# Patient Record
Sex: MALE | Race: WHITE | NOT HISPANIC OR LATINO | Employment: OTHER | URBAN - METROPOLITAN AREA
[De-identification: names, ages, dates, MRNs, and addresses within clinical notes are randomized per-mention and may not be internally consistent; named-entity substitution may affect disease eponyms.]

---

## 2022-12-27 ENCOUNTER — HOSPITAL ENCOUNTER (EMERGENCY)
Facility: HOSPITAL | Age: 52
Discharge: HOME | End: 2022-12-27
Attending: INTERNAL MEDICINE
Payer: MEDICARE

## 2022-12-27 VITALS
DIASTOLIC BLOOD PRESSURE: 90 MMHG | RESPIRATION RATE: 16 BRPM | WEIGHT: 210 LBS | HEIGHT: 68 IN | BODY MASS INDEX: 31.83 KG/M2 | OXYGEN SATURATION: 98 % | HEART RATE: 55 BPM | TEMPERATURE: 98 F | SYSTOLIC BLOOD PRESSURE: 148 MMHG
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
## 2022-12-27 DIAGNOSIS — M25.511 CHRONIC RIGHT SHOULDER PAIN: ICD-10-CM

## 2022-12-27 DIAGNOSIS — S70.02XA CONTUSION OF LEFT HIP, INITIAL ENCOUNTER: Primary | ICD-10-CM

## 2022-12-27 DIAGNOSIS — S46.912A STRAIN OF LEFT SHOULDER, INITIAL ENCOUNTER: ICD-10-CM

## 2022-12-27 DIAGNOSIS — G89.29 CHRONIC RIGHT SHOULDER PAIN: ICD-10-CM

## 2022-12-27 PROCEDURE — 99284 EMERGENCY DEPT VISIT MOD MDM: CPT

## 2022-12-27 RX ORDER — ATORVASTATIN CALCIUM 20 MG/1
20 TABLET, FILM COATED ORAL
COMMUNITY
Start: 2022-04-11

## 2022-12-27 RX ORDER — NAPROXEN 500 MG/1
500 TABLET ORAL 2 TIMES DAILY WITH MEALS
Qty: 30 TABLET | Refills: 0 | Status: SHIPPED | OUTPATIENT
Start: 2022-12-27 | End: 2023-01-11

## 2022-12-27 RX ORDER — LISINOPRIL 5 MG/1
5 TABLET ORAL
COMMUNITY
Start: 2022-12-20

## 2022-12-27 RX ORDER — EZETIMIBE 10 MG/1
TABLET ORAL
COMMUNITY
Start: 2022-07-19

## 2022-12-27 RX ORDER — CARVEDILOL 6.25 MG/1
6.25 TABLET ORAL 2 TIMES DAILY
COMMUNITY
Start: 2022-08-23

## 2022-12-27 RX ORDER — CITALOPRAM 40 MG/1
40 TABLET, FILM COATED ORAL
COMMUNITY
Start: 2022-12-20

## 2022-12-27 RX ORDER — MELOXICAM 15 MG/1
1 TABLET ORAL
COMMUNITY
Start: 2022-06-20 | End: 2022-12-27

## 2022-12-27 RX ORDER — ROSUVASTATIN CALCIUM 20 MG/1
20 TABLET, COATED ORAL
COMMUNITY
Start: 2022-04-12

## 2022-12-27 RX ORDER — GLIPIZIDE 10 MG/1
10 TABLET ORAL 2 TIMES DAILY
COMMUNITY
Start: 2022-08-23

## 2022-12-27 NOTE — DISCHARGE INSTRUCTIONS
Take medicines as prescribed    See your family doctor in one to 2 days for further evaluation, workup, and treatment as necessary    Avoid driving or operating machinery while taking medicines as some medicines might cause drowsiness and may cause problems. Also pain medicines have potential of being addictive  so use Pain meds specially Narcotics Sparingly.    The exam and treatment you received in Emergency Room was for an urgent problem and NOT INTENDED AS COMPLETE CARE. It is important that you FOLLOW UP with a doctor for ongoing care. If your symptoms become WORSE or you DO NOT IMPROVE and you are unable to reach your health care provider, you should RETURN to the emergency department. The Emergency Room doctor has provided a PRELIMINARY INTERPRETATION of all your STUDIES. A final interpretation may be done after you are discharged. IF A CHANGE in your diagnosis or treatment is needed WE WILL CONTACT YOU. It is critical that we have a CURRENT PHONE NUMBER FOR YOU.        See an orthopedics surgeon to evaluate, do further workup and treat it as necessary.    Telephone numbers of Orthopedics Available in [place] are provided above.    If applied , Keep the splint/ Ace on all the time till seen by Orthopedics and treated and cleared.    Take pain medicines as prescribed    Dr. Mahamed Maier  [phone]    Dr. Jack Brown,  [phone]

## 2022-12-27 NOTE — ED PROVIDER NOTES
12/27/2022  1:51 PM    SOURCE OF HISTORY:  History obtained from the patient.    CHIEF COMPLAINT:  Fall (Working in a house attic and fell through the ceiling,  c/o BL shoulder pain, abrasion to left thigh,  denies LOC)      HISTORY OF PRESENT ILLNESS:  Adam Dc is a 52 y.o. male presenting with complaint of left shoulder, left hip pain where he fell in his attic landing between the beams and the beam hit him in the arm pit,    REVIEW OF SYSTEMS:     AS Per Hpi And Below:  General: No Fever.  No Chills.  Head: No Headache.  No Loss Of Consciousness Or Amnesia.  Eyes: No Visual Changes Reported.  Neck:  No Particular Neck Pain Reported By Patient.  Extremities:  Left shoulder and left hip pain   Back: No particular Back Pain reported by Patient.  Respiratory: No Shortness Of Breath.  No Chest Pain.  Cardiovascular: No Palpitations.  Abdomen: No Abdominal Pain.  No Nausea Or Vomiting.  Skin: No Rashes Or Bruising.  Urinary: No Hematuria.  Neurologic: No Numbness.  No Focal Weakness.   All Other Systems Negative Except As Above As Reviewed With Patient.    ALLERGIES:  Review of patient's allergies indicates:  No Known Allergies    HOME MEDICINE LIST:  No current facility-administered medications for this encounter.    Current Outpatient Medications:     atorvastatin (LIPITOR) 20 MG tablet, Take 20 mg by mouth., Disp: , Rfl:     carvediloL (COREG) 6.25 MG tablet, Take 6.25 mg by mouth 2 (two) times daily., Disp: , Rfl:     citalopram (CELEXA) 40 MG tablet, Take 40 mg by mouth., Disp: , Rfl:     ezetimibe (ZETIA) 10 mg tablet, TAKE ONE TABLET BY MOUTH EVERY DAY **DISCONTINUE NIACIN**, Disp: , Rfl:     glipiZIDE (GLUCOTROL) 10 MG tablet, Take 10 mg by mouth 2 (two) times daily., Disp: , Rfl:     lisinopriL (PRINIVIL,ZESTRIL) 5 MG tablet, Take 5 mg by mouth., Disp: , Rfl:     rosuvastatin (CRESTOR) 20 MG tablet, Take 20 mg by mouth., Disp: , Rfl:     naproxen (NAPROSYN) 500 MG tablet, Take 1 tablet (500 mg total)  "by mouth 2 (two) times daily with meals. for 15 days, Disp: 30 tablet, Rfl: 0    PAST MEDICAL HISTORY:  As per HPI and below:  Past Medical History:   Diagnosis Date    Depression     Diabetes mellitus     Hypercholesterolemia     Hypertension        PAST SURGICAL HISTORY:  Past Surgical History:   Procedure Laterality Date    INSERTION, BARE METAL STENT, CORONARY ARTERY  2015    TONSILLECTOMY         FAMILY HISTORY:  Family History   Problem Relation Age of Onset    Heart disease Mother     Diabetes Mother     Hypertension Mother     Heart disease Father     Diabetes Father     Hypertension Father         SOCIAL HISTORY:  Social History     Tobacco Use    Smoking status: Never   Substance Use Topics    Alcohol use: Never    Drug use: Yes     Types: Marijuana       PROBLEM LIST:  There are no problems to display for this patient.      PHYSICAL EXAM:    Vitals:    12/27/22 1257   BP: 137/88   Pulse: 76   Resp: 16   Temp: 97.9 °F (36.6 °C)       /88   Pulse 76   Temp 97.9 °F (36.6 °C) (Oral)   Resp 16   Ht 5' 8" (1.727 m)   Wt 95.3 kg (210 lb)   SpO2 95%   BMI 31.93 kg/m²     Nursing Note And Vital Signs Reviewed.    APPEARANCE: No Acute Distress.  MENTAL STATUS: Alert And Oriented X 3.  GCS 15.  HEAD/FACE: Atraumatic.  EYES:  Conjunctiva Normal.  No Subconjunctival Hemorrhage.  Extraocular Muscles Are Intact.  NECK: No Midline Cervical Tenderness, No Step Off Noted, No Deformities.  Full Range Of Motion.    BACK: No Midline Thoracic Tenderness, No Step Off Noted, No Deformities.  No Midline Lumbar Or Sacral Spine Tenderness, No Step-Offs Noted, No Deformities Noted.   CHEST: No Chest Wall Tenderness. No Gross Bruising. Breath Sounds Are Equal Bilaterally.  No Wheezes.  No Rhonchi.  No Rales.  CARDIOVASCULAR: Regular Rate And Rhythm.  No Murmurs.   ABDOMEN: Soft. Non Distended. No Tenderness.  No Guarding. No Rebound.   MUSCULOSKELETAL:  Tenderness in the left shoulder, no bruising, no ecchymosis, range " of motion is limited due to pain  No other upper extremity injuries identified  Left hip lateral side ecchymosis, a linear abrasion, tenderness, full range of motion but painful.  Rest of the lower extremities do not have any other significant injuries identified  No tenderness in the chest wall  SKIN: No Gross Ecchymoses. No Gross Signs Of Major Trauma.  Small bruise noted on the right flank but it appears to be old  NEUROLOGIC: No Focal Deficit. Speech Normal, Motor Grossly Normal.        MEDICAL DECISION MAKING:      Chart reviewed, Nurses Note Reviewed, Vital Reviewed.      Medical Decision Making  52 y.o. male  has a past medical history of Depression, Diabetes mellitus, Hypercholesterolemia, and Hypertension. presents to ER with left shoulder and left hip pain from a fall through the ceiling in the attic and hurting where he got hit by the beams.        EDCOURSE:  ED Course as of 12/27/22 1445   Tue Dec 27, 2022   1436 I reviewed x-ray myself, I do not find any fractures as radiologist to look at them also. [GQ]   1444 Patient says when he moves his left shoulder it feels like is coming out of the socket, I have advised him that we are going to put his left shoulder in the sling and and it should go and see the Orthopedics and they can do further workup to look for rotator cuff tear, but as such today I do not see major bruising or swelling in the shoulder to say that he might have torn the ligament today, patient already had surgery on his right shoulder in the past, is saying that last week he is had strained his right shoulder and he wanted to get that checked also his right shoulder does have some more space than the left shoulder but there are no acute fractures in either 1 of them.  So I will let him go home to see his family doctor and Orthopedics for follow-up. [GQ]      ED Course User Index  [GQ] Calista Guy MD             ED Prescriptions       Medication Sig Dispense Start Date End Date Auth.  Provider    naproxen (NAPROSYN) 500 MG tablet Take 1 tablet (500 mg total) by mouth 2 (two) times daily with meals. for 15 days 30 tablet 12/27/2022 1/11/2023 Calista Guy MD          Follow-up Information       Follow up With Specialties Details Why Contact [facility]  In 2 days  [address] [place] [address]  [phone]                ED WORKUP AND COURSE:  ED ORDERS:  Orders Placed This Encounter   Procedures    X-Ray Shoulder Complete 2 View Right    X-Ray Shoulder 2 or More Views Left    X-Ray Hip 2 or 3 views Left (with Pelvis when performed)      ED MEDICINE:  Medications - No data to display         EKG:        ED LABS ORDERED AND REVIEWED:  No visits with results within 1 Day(s) from this visit.   Latest known visit with results is:   No results found for any previous visit.       RADIOLOGY STUDIES ORDERED AND REVIEWED:  Imaging Results              X-Ray Hip 2 or 3 views Left (with Pelvis when performed) (Preliminary result)  Result time 12/27/22 14:36:12      ED Interpretation by Calista Guy MD (12/27/22 14:36:12, [facility] - Emergency Dept, Emergency Medicine)    X-ray hip and pelvis:  Independently interpreted by Calista Guy MD  No acute fractures identified                                     X-Ray Shoulder 2 or More Views Left (Final result)  Result time 12/27/22 14:32:13      Final result by Sarkis Hamilton MD (12/27/22 14:32:13)                   Impression:      1. No acute osseous defect identified  2. Osteoarthritis  3. Mild osteopenia  4. MR examination would allow further evaluation if clinically indicated      Electronically signed by: Sarkis Hamilton  Date:    12/27/2022  Time:    14:32               Narrative:    EXAMINATION:  XR SHOULDER COMPLETE 2 OR MORE VIEWS LEFT    CLINICAL HISTORY:  ; fall;.    COMPARISON:  None available.    FINDINGS:  Internal rotation, external rotation, and transscapular Y-views revealno definite fracture or  dislocation.  Arthritic changes are evident at the acromioclavicular joint.  Bony structures are mildly osteopenic.                                       X-Ray Shoulder Complete 2 View Right (Final result)  Result time 12/27/22 14:31:40      Final result by Sarkis Hamilton MD (12/27/22 14:31:40)                   Impression:      1. Mild asymmetric widening of the right AC joint compared to the left  2. Suspect subchondral cyst formation at the right greater tubercle  3. Osteoarthritis  4. MR examination would allow further evaluation if clinically indicated      Electronically signed by: Sarkis Hamilton  Date:    12/27/2022  Time:    14:31               Narrative:    EXAMINATION:  XR SHOULDER COMPLETE 2 OR MORE VIEWS RIGHT    CLINICAL HISTORY:  Unspecified fall, initial encounter; .    COMPARISON:  None available.    FINDINGS:  Internal rotation, external rotation, and transscapular Y-views revealno definite fracture or dislocation.  There is mild asymmetric widening of the right acromioclavicular joint compared to the left.  There is no elevation of the distal clavicle relative to the acromion.  There is suspect subchondral cyst formation at the right greater tubercle.                                      ED COURSE AND REEVALUATIONS:  Vitals:    12/27/22 1257   BP: 137/88   Pulse: 76   Resp: 16   Temp: 97.9 °F (36.6 °C)       PROCEDURES PERFORMED IN ED:  Procedures    ED Course as of 12/27/22 1445   Tue Dec 27, 2022   1436 I reviewed x-ray myself, I do not find any fractures as radiologist to look at them also. [GQ]   1444 Patient says when he moves his left shoulder it feels like is coming out of the socket, I have advised him that we are going to put his left shoulder in the sling and and it should go and see the Orthopedics and they can do further workup to look for rotator cuff tear, but as such today I do not see major bruising or swelling in the shoulder to say that he might have torn the ligament today,  patient already had surgery on his right shoulder in the past, is saying that last week he is had strained his right shoulder and he wanted to get that checked also his right shoulder does have some more space than the left shoulder but there are no acute fractures in either 1 of them.  So I will let him go home to see his family doctor and Orthopedics for follow-up. [GQ]      ED Course User Index  [GQ] Calista Guy MD        [unfilled]     DIAGNOSTIC IMPRESSION:      1. Contusion of left hip, initial encounter    2. Fall    3. Strain of left shoulder, initial encounter    4. Chronic right shoulder pain         ED Disposition Condition    Discharge Stable               Medication List        START taking these medications      naproxen 500 MG tablet  Commonly known as: NAPROSYN  Take 1 tablet (500 mg total) by mouth 2 (two) times daily with meals. for 15 days            CONTINUE taking these medications      atorvastatin 20 MG tablet  Commonly known as: LIPITOR     carvediloL 6.25 MG tablet  Commonly known as: COREG     citalopram 40 MG tablet  Commonly known as: CeleXA     ezetimibe 10 mg tablet  Commonly known as: ZETIA     glipiZIDE 10 MG tablet  Commonly known as: GLUCOTROL     lisinopriL 5 MG tablet  Commonly known as: PRINIVIL,ZESTRIL     rosuvastatin 20 MG tablet  Commonly known as: CRESTOR            STOP taking these medications      meloxicam 15 MG tablet  Commonly known as: MOBIC               Where to Get Your Medications        You can get these medications from any pharmacy    Bring a paper prescription for each of these medications  naproxen 500 MG tablet          ED Prescriptions       Medication Sig Dispense Start Date End Date Auth. Provider    naproxen (NAPROSYN) 500 MG tablet Take 1 tablet (500 mg total) by mouth 2 (two) times daily with meals. for 15 days 30 tablet 12/27/2022 1/11/2023 Calista Guy MD          Follow-up Information       Follow up With Specialties Details Why  Contact Info    [facility]  In 2 days  [address] [facility] [address]  [phone]               Calista Guy MD  12/27/22 4210       Calista Guy MD  12/27/22 2812

## 2023-01-05 ENCOUNTER — OFFICE VISIT (OUTPATIENT)
Dept: ORTHOPEDICS | Facility: CLINIC | Age: 53
End: 2023-01-05
Payer: MEDICARE

## 2023-01-05 DIAGNOSIS — S46.012A TRAUMATIC COMPLETE TEAR OF LEFT ROTATOR CUFF, INITIAL ENCOUNTER: ICD-10-CM

## 2023-01-05 DIAGNOSIS — S43.015A ANTERIOR DISLOCATION OF LEFT SHOULDER, INITIAL ENCOUNTER: Primary | ICD-10-CM

## 2023-01-05 DIAGNOSIS — S43.492A BANKART LESION OF LEFT SHOULDER, INITIAL ENCOUNTER: ICD-10-CM

## 2023-01-05 PROCEDURE — 1160F RVW MEDS BY RX/DR IN RCRD: CPT | Mod: CPTII,,, | Performed by: ORTHOPAEDIC SURGERY

## 2023-01-05 PROCEDURE — 99204 OFFICE O/P NEW MOD 45 MIN: CPT | Mod: ,,, | Performed by: ORTHOPAEDIC SURGERY

## 2023-01-05 PROCEDURE — 1159F PR MEDICATION LIST DOCUMENTED IN MEDICAL RECORD: ICD-10-PCS | Mod: CPTII,,, | Performed by: ORTHOPAEDIC SURGERY

## 2023-01-05 PROCEDURE — 1160F PR REVIEW ALL MEDS BY PRESCRIBER/CLIN PHARMACIST DOCUMENTED: ICD-10-PCS | Mod: CPTII,,, | Performed by: ORTHOPAEDIC SURGERY

## 2023-01-05 PROCEDURE — 99204 PR OFFICE/OUTPT VISIT, NEW, LEVL IV, 45-59 MIN: ICD-10-PCS | Mod: ,,, | Performed by: ORTHOPAEDIC SURGERY

## 2023-01-05 PROCEDURE — 1159F MED LIST DOCD IN RCRD: CPT | Mod: CPTII,,, | Performed by: ORTHOPAEDIC SURGERY

## 2023-01-05 NOTE — PROGRESS NOTES
History of present illness:    This is a 52 y.o. year old male that presents today with left shoulder pain.  He had a traumatic event on 12/27/2022 when he was in his attic and fell through the ceiling while trying to change out his hot water line.  He dislocated his shoulder and actually popped it back in place.  He went to the emergency room just to confirm that the shoulder was in the normal position.  He was placed in a sling.  He continues to have instability episodes of his left shoulder with subluxations and dislocations.  He follows up today for repeat evaluation.  He also complains of some right shoulder pain as well.    Past Medical History:   Diagnosis Date    Depression     Diabetes mellitus     Hypercholesterolemia     Hypertension        Past Surgical History:   Procedure Laterality Date    INSERTION, BARE METAL STENT, CORONARY ARTERY  2015    TONSILLECTOMY         Current Outpatient Medications   Medication Sig    atorvastatin (LIPITOR) 20 MG tablet Take 20 mg by mouth.    carvediloL (COREG) 6.25 MG tablet Take 6.25 mg by mouth 2 (two) times daily.    citalopram (CELEXA) 40 MG tablet Take 40 mg by mouth.    ezetimibe (ZETIA) 10 mg tablet TAKE ONE TABLET BY MOUTH EVERY DAY **DISCONTINUE NIACIN**    glipiZIDE (GLUCOTROL) 10 MG tablet Take 10 mg by mouth 2 (two) times daily.    lisinopriL (PRINIVIL,ZESTRIL) 5 MG tablet Take 5 mg by mouth.    naproxen (NAPROSYN) 500 MG tablet Take 1 tablet (500 mg total) by mouth 2 (two) times daily with meals. for 15 days    rosuvastatin (CRESTOR) 20 MG tablet Take 20 mg by mouth.     No current facility-administered medications for this visit.       Review of patient's allergies indicates:  No Known Allergies    Family History   Problem Relation Age of Onset    Heart disease Mother     Diabetes Mother     Hypertension Mother     Heart disease Father     Diabetes Father     Hypertension Father        Social History     Socioeconomic History    Marital status: Single    Tobacco Use    Smoking status: Never    Smokeless tobacco: Never   Substance and Sexual Activity    Alcohol use: Never    Drug use: Yes     Types: Marijuana       Chief Complaint:   Chief Complaint   Patient presents with    Left Shoulder - Pain    Right Shoulder - Pain    Shoulder Pain     bilateral shoulder pain pt went to [facility] on 12/27/22, fell through ceiling at his house changing hot water line, left shoulder wearing sling, left shoulder comes out of socket when he bends or turns it the wrong way no prior injury or surgery. Right shoulder feels sore, had prior injury and surgery.       Consulting Physician: No ref. provider found      Review of Systems:    All review of systems negative except for those stated in the HPI    Examination:    Vital Signs:  There were no vitals filed for this visit.    There is no height or weight on file to calculate BMI.    Physical Exam:   General: Well-developed, well-nourished.  Neuro: Alert and oriented x 3.  Psych: Normal mood and affect.  Left Shoulder Exam:  No obvious deformity. No medial or lateral scapula winging.  Range of motion and provocative exams are difficult due to pain. 4/5 strength, normal skin appearance and palpable pulses distally. Sensibility normal.        Assessment: Anterior dislocation of left shoulder, initial encounter    Bankart lesion of left shoulder, initial encounter    Traumatic complete tear of left rotator cuff, initial encounter        Plan:    Based on this patient's history, it is imperative that I order an MRI of his left shoulder.  This will definitely be a roadmap for surgery.  I plan to see him back in 1 week to review the MRI.            DISCLAIMER: This note may have been dictated using voice recognition software and may contain grammatical errors.     NOTE: Consult report sent to referring provider via [facility].

## 2023-01-10 ENCOUNTER — OFFICE VISIT (OUTPATIENT)
Dept: ORTHOPEDICS | Facility: CLINIC | Age: 53
End: 2023-01-10
Payer: MEDICARE

## 2023-01-10 DIAGNOSIS — S43.492A BANKART LESION OF LEFT SHOULDER, INITIAL ENCOUNTER: ICD-10-CM

## 2023-01-10 DIAGNOSIS — M19.012 ARTHROSIS OF LEFT ACROMIOCLAVICULAR JOINT: ICD-10-CM

## 2023-01-10 DIAGNOSIS — S43.015A ANTERIOR DISLOCATION OF LEFT SHOULDER, INITIAL ENCOUNTER: Primary | ICD-10-CM

## 2023-01-10 DIAGNOSIS — Z01.818 PREOPERATIVE TESTING: ICD-10-CM

## 2023-01-10 DIAGNOSIS — S46.012A TRAUMATIC COMPLETE TEAR OF LEFT ROTATOR CUFF, INITIAL ENCOUNTER: ICD-10-CM

## 2023-01-10 PROCEDURE — 99214 OFFICE O/P EST MOD 30 MIN: CPT | Mod: ,,, | Performed by: ORTHOPAEDIC SURGERY

## 2023-01-10 PROCEDURE — 1159F MED LIST DOCD IN RCRD: CPT | Mod: CPTII,,, | Performed by: ORTHOPAEDIC SURGERY

## 2023-01-10 PROCEDURE — 1159F PR MEDICATION LIST DOCUMENTED IN MEDICAL RECORD: ICD-10-PCS | Mod: CPTII,,, | Performed by: ORTHOPAEDIC SURGERY

## 2023-01-10 PROCEDURE — 1160F RVW MEDS BY RX/DR IN RCRD: CPT | Mod: CPTII,,, | Performed by: ORTHOPAEDIC SURGERY

## 2023-01-10 PROCEDURE — 1160F PR REVIEW ALL MEDS BY PRESCRIBER/CLIN PHARMACIST DOCUMENTED: ICD-10-PCS | Mod: CPTII,,, | Performed by: ORTHOPAEDIC SURGERY

## 2023-01-10 PROCEDURE — 99214 PR OFFICE/OUTPT VISIT, EST, LEVL IV, 30-39 MIN: ICD-10-PCS | Mod: ,,, | Performed by: ORTHOPAEDIC SURGERY

## 2023-01-10 RX ORDER — GABAPENTIN 100 MG/1
600 CAPSULE ORAL
Status: CANCELLED | OUTPATIENT
Start: 2023-01-10

## 2023-01-10 RX ORDER — SODIUM CHLORIDE, SODIUM GLUCONATE, SODIUM ACETATE, POTASSIUM CHLORIDE AND MAGNESIUM CHLORIDE 30; 37; 368; 526; 502 MG/100ML; MG/100ML; MG/100ML; MG/100ML; MG/100ML
250 INJECTION, SOLUTION INTRAVENOUS CONTINUOUS
Status: CANCELLED | OUTPATIENT
Start: 2023-01-10

## 2023-01-10 RX ORDER — KETOROLAC TROMETHAMINE 10 MG/1
10 TABLET, FILM COATED ORAL
Status: CANCELLED | OUTPATIENT
Start: 2023-01-10 | End: 2023-01-10

## 2023-01-10 RX ORDER — ACETAMINOPHEN 500 MG
1000 TABLET ORAL
Status: CANCELLED | OUTPATIENT
Start: 2023-01-10

## 2023-01-10 NOTE — H&P (VIEW-ONLY)
History of present illness:    This is a 52 y.o. year old male that presents today with left shoulder pain.  He had a traumatic event on 12/27/2022 when he was in his attic and fell through the ceiling while trying to change out his hot water line.  He dislocated his shoulder and actually popped it back in place.  He went to the emergency room just to confirm that the shoulder was in the normal position.  He was placed in a sling.  He continues to have instability episodes of his left shoulder with subluxations and dislocations.  He follows up today for repeat evaluation.  He also complains of some right shoulder pain as well.  01/10/2023 this patient comes in today to review his left shoulder MRI which demonstrates a bony Bankart injury to the shoulder as well as a significant rotator cuff tear greater than 2.5 cm.    Past Medical History:   Diagnosis Date    Depression     Diabetes mellitus     Hypercholesterolemia     Hypertension        Past Surgical History:   Procedure Laterality Date    INSERTION, BARE METAL STENT, CORONARY ARTERY  2015    TONSILLECTOMY         Current Outpatient Medications   Medication Sig    atorvastatin (LIPITOR) 20 MG tablet Take 20 mg by mouth.    carvediloL (COREG) 6.25 MG tablet Take 6.25 mg by mouth 2 (two) times daily.    citalopram (CELEXA) 40 MG tablet Take 40 mg by mouth.    ezetimibe (ZETIA) 10 mg tablet TAKE ONE TABLET BY MOUTH EVERY DAY **DISCONTINUE NIACIN**    glipiZIDE (GLUCOTROL) 10 MG tablet Take 10 mg by mouth 2 (two) times daily.    lisinopriL (PRINIVIL,ZESTRIL) 5 MG tablet Take 5 mg by mouth.    naproxen (NAPROSYN) 500 MG tablet Take 1 tablet (500 mg total) by mouth 2 (two) times daily with meals. for 15 days    rosuvastatin (CRESTOR) 20 MG tablet Take 20 mg by mouth.     No current facility-administered medications for this visit.       Review of patient's allergies indicates:  No Known Allergies    Family History   Problem Relation Age of Onset    Heart disease  Mother     Diabetes Mother     Hypertension Mother     Heart disease Father     Diabetes Father     Hypertension Father        Social History     Socioeconomic History    Marital status: Single   Tobacco Use    Smoking status: Never    Smokeless tobacco: Never   Substance and Sexual Activity    Alcohol use: Never    Drug use: Yes     Types: Marijuana       Chief Complaint:   Chief Complaint   Patient presents with    Left Shoulder - Results    Results     left shoulder MRI results, wearing sling,  pt states nothing has changed since last appt, unable to obtain vitals.        Consulting Physician: No ref. provider found      Review of Systems:    All review of systems negative except for those stated in the HPI    Examination:    Vital Signs:  There were no vitals filed for this visit.    There is no height or weight on file to calculate BMI.    Physical Exam:   General: Well-developed, well-nourished.  Neuro: Alert and oriented x 3.  Psych: Normal mood and affect.  Left Shoulder Exam:  No obvious deformity. No medial or lateral scapula winging.  Range of motion and provocative exams are difficult due to pain. 4/5 strength, normal skin appearance and palpable pulses distally. Sensibility normal.        Assessment: Anterior dislocation of left shoulder, initial encounter  -     Place in Outpatient; Standing  -     Vital signs; Standing  -     Insert peripheral IV; Standing  -     Clip and Prep Other (please specifiy) (Operative site); Standing  -     Cleanse with Chlorhexidine (CHG); Standing  -     Diet NPO; Standing  -     POCT glucose; Standing  -     CBC auto differential; Future  -     Comprehensive metabolic panel; Future  -     X-Ray Chest PA And Lateral; Future; Expected date: 01/10/2023  -     EKG 12-lead; Future  -     Inpatient consult to Anesthesiology; Standing  -     Case Request Operating Room: ARTHROSCOPY, SHOULDER, WITH BANKART REPAIR, DEBRIDEMENT, ROTATOR CUFF, ARTHROSCOPIC  -     Place TIARA hose;  Standing    Bankart lesion of left shoulder, initial encounter  -     Place in Outpatient; Standing  -     Vital signs; Standing  -     Insert peripheral IV; Standing  -     Clip and Prep Other (please specifiy) (Operative site); Standing  -     Cleanse with Chlorhexidine (CHG); Standing  -     Diet NPO; Standing  -     POCT glucose; Standing  -     CBC auto differential; Future  -     Comprehensive metabolic panel; Future  -     X-Ray Chest PA And Lateral; Future; Expected date: 01/10/2023  -     EKG 12-lead; Future  -     Inpatient consult to Anesthesiology; Standing  -     Case Request Operating Room: ARTHROSCOPY, SHOULDER, WITH BANKART REPAIR, DEBRIDEMENT, ROTATOR CUFF, ARTHROSCOPIC  -     Place TIARA hose; Standing    Traumatic complete tear of left rotator cuff, initial encounter  -     Place in Outpatient; Standing  -     Vital signs; Standing  -     Insert peripheral IV; Standing  -     Clip and Prep Other (please specifiy) (Operative site); Standing  -     Cleanse with Chlorhexidine (CHG); Standing  -     Diet NPO; Standing  -     POCT glucose; Standing  -     CBC auto differential; Future  -     Comprehensive metabolic panel; Future  -     X-Ray Chest PA And Lateral; Future; Expected date: 01/10/2023  -     EKG 12-lead; Future  -     Inpatient consult to Anesthesiology; Standing  -     Case Request Operating Room: ARTHROSCOPY, SHOULDER, WITH BANKART REPAIR, DEBRIDEMENT, ROTATOR CUFF, ARTHROSCOPIC  -     Place TIARA hose; Standing    Arthrosis of left acromioclavicular joint  -     Place in Outpatient; Standing  -     Vital signs; Standing  -     Insert peripheral IV; Standing  -     Clip and Prep Other (please specifiy) (Operative site); Standing  -     Cleanse with Chlorhexidine (CHG); Standing  -     Diet NPO; Standing  -     POCT glucose; Standing  -     Comprehensive metabolic panel; Future  -     X-Ray Chest PA And Lateral; Future; Expected date: 01/10/2023  -     EKG 12-lead; Future  -     Inpatient  consult to Anesthesiology; Standing  -     Case Request Operating Room: ARTHROSCOPY, SHOULDER, WITH BANKART REPAIR, DEBRIDEMENT, ROTATOR CUFF, ARTHROSCOPIC  -     Place TIARA hose; Standing    Preoperative testing  -     CBC auto differential; Future    Other orders  -     electrolyte-A infusion  -     IP VTE LOW RISK PATIENT; Standing  -     ceFAZolin (ANCEF) 2 g in dextrose 5 % 50 mL IVPB  -     acetaminophen tablet 1,000 mg  -     ketorolac tablet 10 mg  -     gabapentin capsule 600 mg          Plan:    This is a very significant injury.  The rotator cuff seems to be a acute on chronic type of problem.  More than likely he had a previous significant partial tear or full-thickness tear of the rotator cuff.  He will need surgical intervention for stabilization of the shoulder.  After reviewing his MRI I think the best mode of action would be to do a arthroscopic Bankart repair with a rotator cuff repair or partial rotator cuff repair.  However if the rotator cuff tissue is not amenable to any type of repair, then I will do a staged superior capsular reconstruction.  I did mention to him about the possibility of doing a reverse shoulder arthroplasty but that will be our last option. A surgical video explaining the surgery and the risk was reviewed by the patient.  The surgical procedure was explained to the patient in detail.  The patient acknowledges that they understood the risks, benefits, and other alternatives.  Patient signed an informed consent.  This patient will need a postoperative abduction pillow sling to unload the rotator cuff, labrum, and biceps tendon repair so that patient can heal properly.            DISCLAIMER: This note may have been dictated using voice recognition software and may contain grammatical errors.     NOTE: Consult report sent to referring provider via [employer].

## 2023-01-10 NOTE — PROGRESS NOTES
History of present illness:    This is a 52 y.o. year old male that presents today with left shoulder pain.  He had a traumatic event on 12/27/2022 when he was in his attic and fell through the ceiling while trying to change out his hot water line.  He dislocated his shoulder and actually popped it back in place.  He went to the emergency room just to confirm that the shoulder was in the normal position.  He was placed in a sling.  He continues to have instability episodes of his left shoulder with subluxations and dislocations.  He follows up today for repeat evaluation.  He also complains of some right shoulder pain as well.  01/10/2023 this patient comes in today to review his left shoulder MRI which demonstrates a bony Bankart injury to the shoulder as well as a significant rotator cuff tear greater than 2.5 cm.    Past Medical History:   Diagnosis Date    Depression     Diabetes mellitus     Hypercholesterolemia     Hypertension        Past Surgical History:   Procedure Laterality Date    INSERTION, BARE METAL STENT, CORONARY ARTERY  2015    TONSILLECTOMY         Current Outpatient Medications   Medication Sig    atorvastatin (LIPITOR) 20 MG tablet Take 20 mg by mouth.    carvediloL (COREG) 6.25 MG tablet Take 6.25 mg by mouth 2 (two) times daily.    citalopram (CELEXA) 40 MG tablet Take 40 mg by mouth.    ezetimibe (ZETIA) 10 mg tablet TAKE ONE TABLET BY MOUTH EVERY DAY **DISCONTINUE NIACIN**    glipiZIDE (GLUCOTROL) 10 MG tablet Take 10 mg by mouth 2 (two) times daily.    lisinopriL (PRINIVIL,ZESTRIL) 5 MG tablet Take 5 mg by mouth.    naproxen (NAPROSYN) 500 MG tablet Take 1 tablet (500 mg total) by mouth 2 (two) times daily with meals. for 15 days    rosuvastatin (CRESTOR) 20 MG tablet Take 20 mg by mouth.     No current facility-administered medications for this visit.       Review of patient's allergies indicates:  No Known Allergies    Family History   Problem Relation Age of Onset    Heart disease  Mother     Diabetes Mother     Hypertension Mother     Heart disease Father     Diabetes Father     Hypertension Father        Social History     Socioeconomic History    Marital status: Single   Tobacco Use    Smoking status: Never    Smokeless tobacco: Never   Substance and Sexual Activity    Alcohol use: Never    Drug use: Yes     Types: Marijuana       Chief Complaint:   Chief Complaint   Patient presents with    Left Shoulder - Results    Results     left shoulder MRI results, wearing sling,  pt states nothing has changed since last appt, unable to obtain vitals.        Consulting Physician: No ref. provider found      Review of Systems:    All review of systems negative except for those stated in the HPI    Examination:    Vital Signs:  There were no vitals filed for this visit.    There is no height or weight on file to calculate BMI.    Physical Exam:   General: Well-developed, well-nourished.  Neuro: Alert and oriented x 3.  Psych: Normal mood and affect.  Left Shoulder Exam:  No obvious deformity. No medial or lateral scapula winging.  Range of motion and provocative exams are difficult due to pain. 4/5 strength, normal skin appearance and palpable pulses distally. Sensibility normal.        Assessment: Anterior dislocation of left shoulder, initial encounter  -     Place in Outpatient; Standing  -     Vital signs; Standing  -     Insert peripheral IV; Standing  -     Clip and Prep Other (please specifiy) (Operative site); Standing  -     Cleanse with Chlorhexidine (CHG); Standing  -     Diet NPO; Standing  -     POCT glucose; Standing  -     CBC auto differential; Future  -     Comprehensive metabolic panel; Future  -     X-Ray Chest PA And Lateral; Future; Expected date: 01/10/2023  -     EKG 12-lead; Future  -     Inpatient consult to Anesthesiology; Standing  -     Case Request Operating Room: ARTHROSCOPY, SHOULDER, WITH BANKART REPAIR, DEBRIDEMENT, ROTATOR CUFF, ARTHROSCOPIC  -     Place TIARA hose;  Standing    Bankart lesion of left shoulder, initial encounter  -     Place in Outpatient; Standing  -     Vital signs; Standing  -     Insert peripheral IV; Standing  -     Clip and Prep Other (please specifiy) (Operative site); Standing  -     Cleanse with Chlorhexidine (CHG); Standing  -     Diet NPO; Standing  -     POCT glucose; Standing  -     CBC auto differential; Future  -     Comprehensive metabolic panel; Future  -     X-Ray Chest PA And Lateral; Future; Expected date: 01/10/2023  -     EKG 12-lead; Future  -     Inpatient consult to Anesthesiology; Standing  -     Case Request Operating Room: ARTHROSCOPY, SHOULDER, WITH BANKART REPAIR, DEBRIDEMENT, ROTATOR CUFF, ARTHROSCOPIC  -     Place TIARA hose; Standing    Traumatic complete tear of left rotator cuff, initial encounter  -     Place in Outpatient; Standing  -     Vital signs; Standing  -     Insert peripheral IV; Standing  -     Clip and Prep Other (please specifiy) (Operative site); Standing  -     Cleanse with Chlorhexidine (CHG); Standing  -     Diet NPO; Standing  -     POCT glucose; Standing  -     CBC auto differential; Future  -     Comprehensive metabolic panel; Future  -     X-Ray Chest PA And Lateral; Future; Expected date: 01/10/2023  -     EKG 12-lead; Future  -     Inpatient consult to Anesthesiology; Standing  -     Case Request Operating Room: ARTHROSCOPY, SHOULDER, WITH BANKART REPAIR, DEBRIDEMENT, ROTATOR CUFF, ARTHROSCOPIC  -     Place TIARA hose; Standing    Arthrosis of left acromioclavicular joint  -     Place in Outpatient; Standing  -     Vital signs; Standing  -     Insert peripheral IV; Standing  -     Clip and Prep Other (please specifiy) (Operative site); Standing  -     Cleanse with Chlorhexidine (CHG); Standing  -     Diet NPO; Standing  -     POCT glucose; Standing  -     Comprehensive metabolic panel; Future  -     X-Ray Chest PA And Lateral; Future; Expected date: 01/10/2023  -     EKG 12-lead; Future  -     Inpatient  consult to Anesthesiology; Standing  -     Case Request Operating Room: ARTHROSCOPY, SHOULDER, WITH BANKART REPAIR, DEBRIDEMENT, ROTATOR CUFF, ARTHROSCOPIC  -     Place TIARA hose; Standing    Preoperative testing  -     CBC auto differential; Future    Other orders  -     electrolyte-A infusion  -     IP VTE LOW RISK PATIENT; Standing  -     ceFAZolin (ANCEF) 2 g in dextrose 5 % 50 mL IVPB  -     acetaminophen tablet 1,000 mg  -     ketorolac tablet 10 mg  -     gabapentin capsule 600 mg          Plan:    This is a very significant injury.  The rotator cuff seems to be a acute on chronic type of problem.  More than likely he had a previous significant partial tear or full-thickness tear of the rotator cuff.  He will need surgical intervention for stabilization of the shoulder.  After reviewing his MRI I think the best mode of action would be to do a arthroscopic Bankart repair with a rotator cuff repair or partial rotator cuff repair.  However if the rotator cuff tissue is not amenable to any type of repair, then I will do a staged superior capsular reconstruction.  I did mention to him about the possibility of doing a reverse shoulder arthroplasty but that will be our last option. A surgical video explaining the surgery and the risk was reviewed by the patient.  The surgical procedure was explained to the patient in detail.  The patient acknowledges that they understood the risks, benefits, and other alternatives.  Patient signed an informed consent.  This patient will need a postoperative abduction pillow sling to unload the rotator cuff, labrum, and biceps tendon repair so that patient can heal properly.            DISCLAIMER: This note may have been dictated using voice recognition software and may contain grammatical errors.     NOTE: Consult report sent to referring provider via [employer].

## 2023-01-17 ENCOUNTER — ANESTHESIA EVENT (OUTPATIENT)
Dept: SURGERY | Facility: HOSPITAL | Age: 53
End: 2023-01-17
Payer: MEDICARE

## 2023-01-18 ENCOUNTER — HOSPITAL ENCOUNTER (OUTPATIENT)
Dept: RADIOLOGY | Facility: HOSPITAL | Age: 53
Discharge: HOME | End: 2023-01-18
Payer: MEDICARE

## 2023-01-18 DIAGNOSIS — S43.015A ANTERIOR DISLOCATION OF LEFT SHOULDER, INITIAL ENCOUNTER: ICD-10-CM

## 2023-01-18 DIAGNOSIS — S46.012A TRAUMATIC COMPLETE TEAR OF LEFT ROTATOR CUFF, INITIAL ENCOUNTER: ICD-10-CM

## 2023-01-18 DIAGNOSIS — M19.012 ARTHROSIS OF LEFT ACROMIOCLAVICULAR JOINT: ICD-10-CM

## 2023-01-18 DIAGNOSIS — S43.492A BANKART LESION OF LEFT SHOULDER, INITIAL ENCOUNTER: ICD-10-CM

## 2023-01-18 PROCEDURE — 71046 X-RAY EXAM CHEST 2 VIEWS: CPT | Mod: TC

## 2023-01-19 ENCOUNTER — TELEPHONE (OUTPATIENT)
Dept: PREADMISSION TESTING | Facility: HOSPITAL | Age: 53
End: 2023-01-19

## 2023-01-22 NOTE — ANESTHESIA PREPROCEDURE EVALUATION
01/22/2023  Adam Dc is a 52 y.o., male , who presents for the following:    Procedures:        ARTHROSCOPY, SHOULDER, WITH BANKART REPAIR (Left) - Arthrex, lateral shoulder traction, bean bag, block       DEBRIDEMENT, ROTATOR CUFF, ARTHROSCOPIC (Left)   Anesthesia type: General/Regional   Diagnosis:        Anterior dislocation of left shoulder, initial encounter [S43.015A]       Bankart lesion of left shoulder, initial encounter [S43.492A]       Traumatic complete tear of left rotator cuff, initial encounter [S46.012A]       Arthrosis of left acromioclavicular joint [M19.012]   Pre-op diagnosis:        Anterior dislocation of left shoulder, initial encounter [S43.015A]       Bankart lesion of left shoulder, initial encounter [S43.492A]       Traumatic complete tear of left rotator cuff, initial encounter [S46.012A]       Arthrosis of left acromioclavicular joint [M19.012]   Location: [facility] OR [address] [place] [address] OR   Surgeons: Jack Brown MD       Pre-op Assessment    I have reviewed the Patient Summary Reports.     I have reviewed the Nursing Notes. I have reviewed the NPO Status.   I have reviewed the Medications.     Review of Systems  Anesthesia Hx:  No problems with previous Anesthesia  Denies Family Hx of Anesthesia complications.   Denies Personal Hx of Anesthesia complications.   Social:  Non-Smoker    Cardiovascular:   Hypertension CAD   hyperlipidemia s/p MI  s/p stent (DESC-L. Circ.) approx. 7 yrs ago  > 4 METS activity   Pulmonary:  Pulmonary Normal    Endocrine:   Diabetes    Psych:   depression          Physical Exam  General: Alert and Oriented    Airway:  Mallampati: II   Mouth Opening: Normal  TM Distance: Normal  Tongue: Normal  Neck ROM: Normal ROM    Dental:Age Related Wear  Chest/Lungs:  Normal Respiratory Rate    Heart:  Rate: Normal  Rhythm: Regular Rhythm       Latest Reference Range &  Units 01/18/23 09:53   Sodium 136 - 145 mmol/L 137   Potassium 3.5 - 5.1 mmol/L 4.9   Chloride 98 - 107 mmol/L 105   CO2 22 - 29 mmol/L 26   BUN 8.4 - 25.7 mg/dL 16.3   Creatinine 0.73 - 1.18 mg/dL 0.99   eGFR mls/min/1.73/m2 >60   Glucose 74 - 100 mg/dL 241 (H)   (H): Data is abnormally high   Latest Reference Range & Units 01/18/23 09:53   Hemoglobin 14.0 - 18.0 gm/dL 14.6   Hematocrit 42.0 - 52.0 % 43.4   MCV 80.0 - 94.0 fL 88.2   MCH pg 29.7   MCHC 33.0 - 36.0 mg/dL 33.6   RDW 11.5 - 17.0 % 12.5   Platelets 130 - 400 x10(3)/mcL 253             Anesthesia Plan  Type of Anesthesia, risks & benefits discussed:    Anesthesia Type: Gen ETT  Intra-op Monitoring Plan: Standard ASA Monitors  Post Op Pain Control Plan: IV/PO Opioids PRN, multimodal analgesia and peripheral nerve block  Induction:  IV  Airway Plan: Direct  Informed Consent: Informed consent signed with the Patient and all parties understand the risks and agree with anesthesia plan.  All questions answered. Patient consented to blood products? Yes  ASA Score: 2  Day of Surgery Review of History & Physical: H&P Update referred to the surgeon/provider.H&P completed by Anesthesiologist.  Anesthesia Plan Notes: ERAS / Supraclavicular Block for post-operative analgesia, per surgeon request    Ready For Surgery From Anesthesia Perspective.     .

## 2023-01-23 ENCOUNTER — HOSPITAL ENCOUNTER (OUTPATIENT)
Facility: HOSPITAL | Age: 53
Discharge: HOME | End: 2023-01-23
Attending: ORTHOPAEDIC SURGERY | Admitting: ORTHOPAEDIC SURGERY
Payer: MEDICARE

## 2023-01-23 ENCOUNTER — ANESTHESIA (OUTPATIENT)
Dept: SURGERY | Facility: HOSPITAL | Age: 53
End: 2023-01-23
Payer: MEDICARE

## 2023-01-23 VITALS
TEMPERATURE: 98 F | HEIGHT: 68 IN | RESPIRATION RATE: 20 BRPM | HEART RATE: 79 BPM | SYSTOLIC BLOOD PRESSURE: 132 MMHG | OXYGEN SATURATION: 95 % | DIASTOLIC BLOOD PRESSURE: 76 MMHG | BODY MASS INDEX: 32.44 KG/M2 | WEIGHT: 214.06 LBS

## 2023-01-23 DIAGNOSIS — S43.015A ANTERIOR DISLOCATION OF LEFT SHOULDER, INITIAL ENCOUNTER: ICD-10-CM

## 2023-01-23 DIAGNOSIS — S46.012A TRAUMATIC COMPLETE TEAR OF LEFT ROTATOR CUFF, INITIAL ENCOUNTER: ICD-10-CM

## 2023-01-23 DIAGNOSIS — M19.012 ARTHROSIS OF LEFT ACROMIOCLAVICULAR JOINT: ICD-10-CM

## 2023-01-23 DIAGNOSIS — Z01.818 PREOP TESTING: ICD-10-CM

## 2023-01-23 DIAGNOSIS — S43.492A BANKART LESION OF LEFT SHOULDER, INITIAL ENCOUNTER: Primary | ICD-10-CM

## 2023-01-23 LAB — POCT GLUCOSE: 209 MG/DL (ref 70–110)

## 2023-01-23 PROCEDURE — 64415 NJX AA&/STRD BRCH PLXS IMG: CPT | Mod: 59,LT | Performed by: ANESTHESIOLOGY

## 2023-01-23 PROCEDURE — 82962 GLUCOSE BLOOD TEST: CPT | Performed by: ORTHOPAEDIC SURGERY

## 2023-01-23 PROCEDURE — 36000711: Performed by: ORTHOPAEDIC SURGERY

## 2023-01-23 PROCEDURE — 63600175 PHARM REV CODE 636 W HCPCS: Performed by: ANESTHESIOLOGY

## 2023-01-23 PROCEDURE — 25000003 PHARM REV CODE 250: Performed by: NURSE ANESTHETIST, CERTIFIED REGISTERED

## 2023-01-23 PROCEDURE — 37000009 HC ANESTHESIA EA ADD 15 MINS: Performed by: ORTHOPAEDIC SURGERY

## 2023-01-23 PROCEDURE — 63600175 PHARM REV CODE 636 W HCPCS: Mod: TB,JG

## 2023-01-23 PROCEDURE — 36000710: Performed by: ORTHOPAEDIC SURGERY

## 2023-01-23 PROCEDURE — 27201423 OPTIME MED/SURG SUP & DEVICES STERILE SUPPLY: Performed by: ORTHOPAEDIC SURGERY

## 2023-01-23 PROCEDURE — 71000015 HC POSTOP RECOV 1ST HR: Performed by: ORTHOPAEDIC SURGERY

## 2023-01-23 PROCEDURE — 29806 SHO ARTHRS SRG CAPSULORRAPHY: CPT | Mod: LT,,, | Performed by: ORTHOPAEDIC SURGERY

## 2023-01-23 PROCEDURE — 25000003 PHARM REV CODE 250

## 2023-01-23 PROCEDURE — C1713 ANCHOR/SCREW BN/BN,TIS/BN: HCPCS | Performed by: ORTHOPAEDIC SURGERY

## 2023-01-23 PROCEDURE — 63600175 PHARM REV CODE 636 W HCPCS: Performed by: NURSE ANESTHETIST, CERTIFIED REGISTERED

## 2023-01-23 PROCEDURE — 37000008 HC ANESTHESIA 1ST 15 MINUTES: Performed by: ORTHOPAEDIC SURGERY

## 2023-01-23 PROCEDURE — 71000016 HC POSTOP RECOV ADDL HR: Performed by: ORTHOPAEDIC SURGERY

## 2023-01-23 PROCEDURE — 76942 ECHO GUIDE FOR BIOPSY: CPT | Performed by: ANESTHESIOLOGY

## 2023-01-23 PROCEDURE — 29806 PR SHLDR ARTHROSCOP,SURG,CAPSULORRHAPHY: ICD-10-PCS | Mod: LT,,, | Performed by: ORTHOPAEDIC SURGERY

## 2023-01-23 PROCEDURE — 63600175 PHARM REV CODE 636 W HCPCS

## 2023-01-23 DEVICE — SUTR ANCH,BIO-COMP P-LCK,2.9X 12.5MM
Type: IMPLANTABLE DEVICE | Site: SHOULDER | Status: FUNCTIONAL
Brand: ARTHREX®

## 2023-01-23 DEVICE — PEEK SWIVELOCK C, 5.5X19.1MM
Type: IMPLANTABLE DEVICE | Site: SHOULDER | Status: FUNCTIONAL
Brand: ARTHREX®

## 2023-01-23 DEVICE — SUTURE ANCHOR, PEEK CORKSCREW FT
Type: IMPLANTABLE DEVICE | Site: SHOULDER | Status: FUNCTIONAL
Brand: ARTHREX®

## 2023-01-23 RX ORDER — METOCLOPRAMIDE HYDROCHLORIDE 5 MG/ML
10 INJECTION INTRAMUSCULAR; INTRAVENOUS EVERY 6 HOURS PRN
Status: DISCONTINUED | OUTPATIENT
Start: 2023-01-23 | End: 2023-01-23 | Stop reason: HOSPADM

## 2023-01-23 RX ORDER — ROPIVACAINE HYDROCHLORIDE 5 MG/ML
40 INJECTION, SOLUTION EPIDURAL; INFILTRATION; PERINEURAL ONCE
Status: DISCONTINUED | OUTPATIENT
Start: 2023-01-23 | End: 2023-01-23

## 2023-01-23 RX ORDER — MIDAZOLAM HYDROCHLORIDE 1 MG/ML
INJECTION INTRAMUSCULAR; INTRAVENOUS
Status: COMPLETED
Start: 2023-01-23 | End: 2023-01-23

## 2023-01-23 RX ORDER — GABAPENTIN 300 MG/1
600 CAPSULE ORAL
Status: COMPLETED | OUTPATIENT
Start: 2023-01-23 | End: 2023-01-23

## 2023-01-23 RX ORDER — ROPIVACAINE HYDROCHLORIDE 5 MG/ML
INJECTION, SOLUTION EPIDURAL; INFILTRATION; PERINEURAL
Status: COMPLETED
Start: 2023-01-23 | End: 2023-01-23

## 2023-01-23 RX ORDER — SODIUM CHLORIDE 9 MG/ML
INJECTION, SOLUTION INTRAVENOUS CONTINUOUS
Status: DISCONTINUED | OUTPATIENT
Start: 2023-01-23 | End: 2023-01-23 | Stop reason: HOSPADM

## 2023-01-23 RX ORDER — METHOCARBAMOL 500 MG/1
1000 TABLET, FILM COATED ORAL EVERY 6 HOURS PRN
Status: DISCONTINUED | OUTPATIENT
Start: 2023-01-23 | End: 2023-01-23 | Stop reason: HOSPADM

## 2023-01-23 RX ORDER — ONDANSETRON 2 MG/ML
4 INJECTION INTRAMUSCULAR; INTRAVENOUS EVERY 6 HOURS PRN
Status: DISCONTINUED | OUTPATIENT
Start: 2023-01-23 | End: 2023-01-23 | Stop reason: HOSPADM

## 2023-01-23 RX ORDER — DEXAMETHASONE SODIUM PHOSPHATE 4 MG/ML
INJECTION, SOLUTION INTRA-ARTICULAR; INTRALESIONAL; INTRAMUSCULAR; INTRAVENOUS; SOFT TISSUE
Status: DISCONTINUED | OUTPATIENT
Start: 2023-01-23 | End: 2023-01-23

## 2023-01-23 RX ORDER — MAG HYDROX/ALUMINUM HYD/SIMETH 200-200-20
30 SUSPENSION, ORAL (FINAL DOSE FORM) ORAL EVERY 6 HOURS PRN
Status: DISCONTINUED | OUTPATIENT
Start: 2023-01-23 | End: 2023-01-23 | Stop reason: HOSPADM

## 2023-01-23 RX ORDER — LIDOCAINE HYDROCHLORIDE 20 MG/ML
INJECTION, SOLUTION EPIDURAL; INFILTRATION; INTRACAUDAL; PERINEURAL
Status: DISCONTINUED | OUTPATIENT
Start: 2023-01-23 | End: 2023-01-23

## 2023-01-23 RX ORDER — KETOROLAC TROMETHAMINE 10 MG/1
10 TABLET, FILM COATED ORAL
Status: COMPLETED | OUTPATIENT
Start: 2023-01-23 | End: 2023-01-23

## 2023-01-23 RX ORDER — ASPIRIN 81 MG/1
81 TABLET ORAL DAILY
COMMUNITY

## 2023-01-23 RX ORDER — SODIUM CHLORIDE, SODIUM LACTATE, POTASSIUM CHLORIDE, CALCIUM CHLORIDE 600; 310; 30; 20 MG/100ML; MG/100ML; MG/100ML; MG/100ML
INJECTION, SOLUTION INTRAVENOUS CONTINUOUS
Status: DISCONTINUED | OUTPATIENT
Start: 2023-01-23 | End: 2023-01-23 | Stop reason: HOSPADM

## 2023-01-23 RX ORDER — ONDANSETRON 2 MG/ML
4 INJECTION INTRAMUSCULAR; INTRAVENOUS ONCE AS NEEDED
Status: COMPLETED | OUTPATIENT
Start: 2023-01-23 | End: 2023-01-23

## 2023-01-23 RX ORDER — METHOCARBAMOL 750 MG/1
750 TABLET, FILM COATED ORAL EVERY 6 HOURS
Qty: 56 TABLET | Refills: 0 | Status: SHIPPED | OUTPATIENT
Start: 2023-01-23 | End: 2023-02-06

## 2023-01-23 RX ORDER — GABAPENTIN 300 MG/1
300 CAPSULE ORAL EVERY 8 HOURS
Qty: 15 CAPSULE | Refills: 0 | Status: SHIPPED | OUTPATIENT
Start: 2023-01-23 | End: 2023-01-28

## 2023-01-23 RX ORDER — KETOROLAC TROMETHAMINE 10 MG/1
10 TABLET, FILM COATED ORAL EVERY 6 HOURS
Qty: 20 TABLET | Refills: 0 | Status: SHIPPED | OUTPATIENT
Start: 2023-01-23 | End: 2023-01-28

## 2023-01-23 RX ORDER — ROCURONIUM BROMIDE 10 MG/ML
INJECTION, SOLUTION INTRAVENOUS
Status: DISCONTINUED | OUTPATIENT
Start: 2023-01-23 | End: 2023-01-23

## 2023-01-23 RX ORDER — GLYCOPYRROLATE 0.2 MG/ML
INJECTION INTRAMUSCULAR; INTRAVENOUS
Status: DISCONTINUED | OUTPATIENT
Start: 2023-01-23 | End: 2023-01-23

## 2023-01-23 RX ORDER — KETOROLAC TROMETHAMINE 30 MG/ML
INJECTION, SOLUTION INTRAMUSCULAR; INTRAVENOUS
Status: DISCONTINUED | OUTPATIENT
Start: 2023-01-23 | End: 2023-01-23

## 2023-01-23 RX ORDER — ONDANSETRON 4 MG/1
4 TABLET, ORALLY DISINTEGRATING ORAL EVERY 6 HOURS PRN
Qty: 30 TABLET | Refills: 0 | Status: SHIPPED | OUTPATIENT
Start: 2023-01-23 | End: 2023-02-02

## 2023-01-23 RX ORDER — ASPIRIN 81 MG/1
81 TABLET ORAL 2 TIMES DAILY
Qty: 60 TABLET | Refills: 0 | Status: SHIPPED | OUTPATIENT
Start: 2023-01-23 | End: 2023-02-22

## 2023-01-23 RX ORDER — PROPOFOL 10 MG/ML
VIAL (ML) INTRAVENOUS
Status: DISCONTINUED | OUTPATIENT
Start: 2023-01-23 | End: 2023-01-23

## 2023-01-23 RX ORDER — FENTANYL CITRATE 50 UG/ML
INJECTION, SOLUTION INTRAMUSCULAR; INTRAVENOUS
Status: DISCONTINUED | OUTPATIENT
Start: 2023-01-23 | End: 2023-01-23

## 2023-01-23 RX ORDER — OXYCODONE HYDROCHLORIDE 5 MG/1
5 TABLET ORAL EVERY 12 HOURS PRN
Qty: 14 TABLET | Refills: 0 | Status: SHIPPED | OUTPATIENT
Start: 2023-01-23 | End: 2023-01-30

## 2023-01-23 RX ORDER — MIDAZOLAM HYDROCHLORIDE 1 MG/ML
2 INJECTION INTRAMUSCULAR; INTRAVENOUS ONCE AS NEEDED
Status: COMPLETED | OUTPATIENT
Start: 2023-01-23 | End: 2023-01-23

## 2023-01-23 RX ORDER — CEFAZOLIN SODIUM 2 G/100ML
2 INJECTION, SOLUTION INTRAVENOUS
Status: COMPLETED | OUTPATIENT
Start: 2023-01-23 | End: 2023-01-23

## 2023-01-23 RX ORDER — ACETAMINOPHEN 500 MG
1000 TABLET ORAL EVERY 8 HOURS PRN
Qty: 50 TABLET | Refills: 0 | Status: SHIPPED | OUTPATIENT
Start: 2023-01-23

## 2023-01-23 RX ORDER — MORPHINE SULFATE 4 MG/ML
3 INJECTION, SOLUTION INTRAMUSCULAR; INTRAVENOUS
Status: DISCONTINUED | OUTPATIENT
Start: 2023-01-23 | End: 2023-01-23 | Stop reason: HOSPADM

## 2023-01-23 RX ORDER — ROPIVACAINE HYDROCHLORIDE 5 MG/ML
INJECTION, SOLUTION EPIDURAL; INFILTRATION; PERINEURAL
Status: COMPLETED | OUTPATIENT
Start: 2023-01-23 | End: 2023-01-23

## 2023-01-23 RX ORDER — HYDROMORPHONE HYDROCHLORIDE 2 MG/ML
0.2 INJECTION, SOLUTION INTRAMUSCULAR; INTRAVENOUS; SUBCUTANEOUS EVERY 5 MIN PRN
Status: DISCONTINUED | OUTPATIENT
Start: 2023-01-23 | End: 2023-01-23 | Stop reason: HOSPADM

## 2023-01-23 RX ORDER — ACETAMINOPHEN 500 MG
1000 TABLET ORAL
Status: COMPLETED | OUTPATIENT
Start: 2023-01-23 | End: 2023-01-23

## 2023-01-23 RX ORDER — PHENYLEPHRINE HYDROCHLORIDE 10 MG/ML
INJECTION INTRAVENOUS
Status: DISCONTINUED | OUTPATIENT
Start: 2023-01-23 | End: 2023-01-23

## 2023-01-23 RX ORDER — HYDROCODONE BITARTRATE AND ACETAMINOPHEN 5; 325 MG/1; MG/1
1 TABLET ORAL EVERY 4 HOURS PRN
Status: DISCONTINUED | OUTPATIENT
Start: 2023-01-23 | End: 2023-01-23 | Stop reason: HOSPADM

## 2023-01-23 RX ORDER — MELOXICAM 7.5 MG/1
TABLET ORAL
Qty: 30 TABLET | Refills: 0 | Status: SHIPPED | OUTPATIENT
Start: 2023-01-29 | End: 2023-02-07

## 2023-01-23 RX ORDER — SODIUM CHLORIDE, SODIUM GLUCONATE, SODIUM ACETATE, POTASSIUM CHLORIDE AND MAGNESIUM CHLORIDE 30; 37; 368; 526; 502 MG/100ML; MG/100ML; MG/100ML; MG/100ML; MG/100ML
250 INJECTION, SOLUTION INTRAVENOUS CONTINUOUS
Status: DISCONTINUED | OUTPATIENT
Start: 2023-01-23 | End: 2023-01-23 | Stop reason: HOSPADM

## 2023-01-23 RX ORDER — LIDOCAINE HYDROCHLORIDE 10 MG/ML
1 INJECTION, SOLUTION EPIDURAL; INFILTRATION; INTRACAUDAL; PERINEURAL ONCE
Status: DISCONTINUED | OUTPATIENT
Start: 2023-01-23 | End: 2023-01-23 | Stop reason: HOSPADM

## 2023-01-23 RX ADMIN — ROPIVACAINE HYDROCHLORIDE 40 ML: 5 INJECTION, SOLUTION EPIDURAL; INFILTRATION; PERINEURAL at 09:01

## 2023-01-23 RX ADMIN — SODIUM CHLORIDE, SODIUM GLUCONATE, SODIUM ACETATE, POTASSIUM CHLORIDE AND MAGNESIUM CHLORIDE 500 ML/HR: 526; 502; 368; 37; 30 INJECTION, SOLUTION INTRAVENOUS at 11:01

## 2023-01-23 RX ADMIN — MIDAZOLAM HYDROCHLORIDE 2 MG: 1 INJECTION INTRAMUSCULAR; INTRAVENOUS at 09:01

## 2023-01-23 RX ADMIN — ROCURONIUM BROMIDE 70 MG: 10 SOLUTION INTRAVENOUS at 09:01

## 2023-01-23 RX ADMIN — FENTANYL CITRATE 100 MCG: 50 INJECTION, SOLUTION INTRAMUSCULAR; INTRAVENOUS at 09:01

## 2023-01-23 RX ADMIN — PROPOFOL 200 MG: 10 INJECTION, EMULSION INTRAVENOUS at 09:01

## 2023-01-23 RX ADMIN — PHENYLEPHRINE HYDROCHLORIDE 100 MCG: 10 INJECTION INTRAVENOUS at 10:01

## 2023-01-23 RX ADMIN — GLYCOPYRROLATE 0.2 MG: 0.2 INJECTION INTRAMUSCULAR; INTRAVENOUS at 09:01

## 2023-01-23 RX ADMIN — ACETAMINOPHEN 1000 MG: 500 TABLET ORAL at 08:01

## 2023-01-23 RX ADMIN — KETOROLAC TROMETHAMINE 30 MG: 30 INJECTION, SOLUTION INTRAMUSCULAR at 12:01

## 2023-01-23 RX ADMIN — MIDAZOLAM HYDROCHLORIDE 2 MG: 1 INJECTION, SOLUTION INTRAMUSCULAR; INTRAVENOUS at 09:01

## 2023-01-23 RX ADMIN — CEFAZOLIN SODIUM 2 G: 2 INJECTION, SOLUTION INTRAVENOUS at 10:01

## 2023-01-23 RX ADMIN — KETOROLAC TROMETHAMINE 10 MG: 10 TABLET, FILM COATED ORAL at 08:01

## 2023-01-23 RX ADMIN — GABAPENTIN 600 MG: 300 CAPSULE ORAL at 08:01

## 2023-01-23 RX ADMIN — DEXAMETHASONE SODIUM PHOSPHATE 4 MG: 4 INJECTION, SOLUTION INTRA-ARTICULAR; INTRALESIONAL; INTRAMUSCULAR; INTRAVENOUS; SOFT TISSUE at 10:01

## 2023-01-23 RX ADMIN — PHENYLEPHRINE HYDROCHLORIDE 200 MCG: 10 INJECTION INTRAVENOUS at 10:01

## 2023-01-23 RX ADMIN — ONDANSETRON HYDROCHLORIDE 4 MG: 2 SOLUTION INTRAMUSCULAR; INTRAVENOUS at 12:01

## 2023-01-23 RX ADMIN — SUGAMMADEX 200 MG: 100 INJECTION, SOLUTION INTRAVENOUS at 12:01

## 2023-01-23 RX ADMIN — PHENYLEPHRINE HYDROCHLORIDE 0.2 MCG/KG/MIN: 10 INJECTION INTRAVENOUS at 10:01

## 2023-01-23 RX ADMIN — SODIUM CHLORIDE, SODIUM GLUCONATE, SODIUM ACETATE, POTASSIUM CHLORIDE AND MAGNESIUM CHLORIDE 250 ML/HR: 526; 502; 368; 37; 30 INJECTION, SOLUTION INTRAVENOUS at 08:01

## 2023-01-23 RX ADMIN — LIDOCAINE HYDROCHLORIDE 5 ML: 20 INJECTION, SOLUTION EPIDURAL; INFILTRATION; INTRACAUDAL; PERINEURAL at 09:01

## 2023-01-23 NOTE — OR NURSING
09:38  timeout done    09:46  dr. Garrison will attempt to do a left supraclavicular nerve block.    09:48  block completed and tolerated well.

## 2023-01-23 NOTE — ANESTHESIA PROCEDURE NOTES
Intubation    Date/Time: 1/23/2023 9:58 AM  Performed by: Ham Morrissey CRNA  Authorized by: Gene Garrison MD     Intubation:     Induction:  Intravenous    Intubated:  Postinduction    Mask Ventilation:  Easy mask    Attempts:  1    Attempted By:  CRNA    Method of Intubation:  Direct    Blade:  Casey 2    Laryngeal View Grade: Grade I - full view of cords      Difficult Airway Encountered?: No      Complications:  None    Airway Device:  Oral endotracheal tube    Airway Device Size:  7.5    Style/Cuff Inflation:  Cuffed (inflated to minimal occlusive pressure)    Tube secured:  23    Secured at:  The lips    Placement Verified By:  Capnometry    Complicating Factors:  None    Findings Post-Intubation:  BS equal bilateral

## 2023-01-23 NOTE — TRANSFER OF CARE
"Anesthesia Transfer of Care Note    Patient: Adam Dc    Procedure(s) Performed: Procedure(s) (LRB):  ARTHROSCOPY, SHOULDER, WITH BANKART REPAIR (Left)  DEBRIDEMENT, ROTATOR CUFF, ARTHROSCOPIC (Left)    Patient location: PACU    Anesthesia Type: general    Transport from OR: Transported from OR on room air with adequate spontaneous ventilation    Post pain: adequate analgesia    Post assessment: no apparent anesthetic complications    Post vital signs: stable    Level of consciousness: sedated    Nausea/Vomiting: no nausea/vomiting    Complications: none    Transfer of care protocol was followed      Last vitals:   Visit Vitals  /77   Pulse 74   Temp 36.5 °C (97.7 °F)   Resp 20   Ht 5' 8" (1.727 m)   Wt 97.1 kg (214 lb 1.1 oz)   SpO2 (!) 93%   BMI 32.55 kg/m²     "

## 2023-01-23 NOTE — OP NOTE
01/23/2023    PRIMARY SURGEON: Jack Brown MD    ASSISTANT: Leandra Blood NP was imperative to the critical parts of the surgical case.  This included the surgical approach and dissection, retraction, placement of hardware and implants, and closure.    PREOPERATIVE DIAGNOSIS:  1.  Left Bankart injury  2.  Hill-Sachs lesion  3. Massive rotator cuff tear    POSTOPERATIVE DIAGNOSIS:  1.  Left Bankart injury  2.  Hill-Sachs lesion  3. Massive rotator cuff tear  4. Degenerative labral tears and biceps stump  5. Multiple intra-articular loose bodies    PROCEDURES:  1.  Left diagnostic shoulder arthroscopy  2.  Arthroscopic Bankart repair  3.  Remplissage  4. Arthroscopic Loose bodies removal  5. Arthroscopic debridement of labrum and biceps stump  6. Arthroscopic rotator cuff repair (partial repair of infraspinatus, could not mobilize the supraspinatus)    ANESTHESIA:  General anesthesia with supraclavicular nerve block    BLOOD LOSS:  Less than 30 cc    DVT PROPHYLAXIS:  Aspirin for 4-6 weeks    OUTCOME:  Patient tolerated treatment/procedure well without complications and is now ready for discharge.    DISPOSITION: Home/SelfCare    FOLLOW UP: In clinic    INSTRUMENTATION:  Bankart repair:  Arthrex 2.9 mm PushLock anchors  Remplissage:  Arthrex 3.5 mm double loaded SwiveLock anchor  Implant Name Type Inv. Item Serial No.  Lot No. LRB No. Used Action   suture anchor, peek corkscrew ft    SHUKRI [id number] Left 1 Implanted   1.8MM KNOTLESS FIBERTAK SOFT ANCHOR    ARTHREX  Left 3 Implanted   1.8MM KNOTLESS    ARTHREX [id number] Left 1 Implanted and Explanted   ANCHOR SUTURE PUSHLOK 2.9X12.5 - [id number]  ANCHOR SUTURE PUSHLOK 2.9X12.5  ARTHREX [id number] Left 1 Implanted   KNOTLESS 2.6 FIBERTAK    ARTHREX [id number] Left 1 Implanted   KNOTLESS 2.6 FIBERTAK    ARTHREX [id number] Left 1 Implanted   ANCHOR SUT 5.5X19.1MM SL - [id number]  ANCHOR SUT 5.5X19.1MM SL  ARTHREX [id number] Left 1 Implanted   ANCHOR SUT  5.5X19.1MM SL - [id number]  ANCHOR SUT 5.5X19.1MM SL  ARTHREX [id number] Left 1 Implanted       PROCEDURE IN DETAIL:    Diagnostic arthroscopy of shoulder    The examination under anesthesia was performed for skin defects and other contraindications and none were found.The patient was carefully placed in a lateral decubitus position. All bony prominences were padded and sterile U-drape was applied. Patients operative extremity was placed in suspension device with 7-10lbs of weight applied. The skin was prepped in normal sterile fashion. A time out was performed to confirm correct operative extremity, allergies, and antibiotic infusion. All portals were made with an 11-blade and an 18-gauge spinal needle was used to help probe and find proper alignment for the portals. After creating posterior portal, an arthroscope was inserted into the glenohumeral joint. A diagnostic arthroscopy was then performed in this sequential order: biceps anchor, rotator interval, subscapularis tendon, superior glenohumeral ligament, middle glenohumeral ligament, inferior glenohumeral ligament, anterior and inferior capsular attachments, anterior, inferior, and posterior labrum, teres minor tendon, infraspinatus tendon, and supraspinatus tendon, and biceps tendon in the rotator interval.    The certified assistant provided critical assistance by holding instruments including the arthroscope to provide adequate visualization of the procedure, as well as assisting in wound closure.    At the end of the procedure the portals were closed with mastisol around the wound and placement of steri-strips. The patient tolerated the procedure well and taken to the recovery room in good condition.      Arthroscopic Bankart repair    With arthoroscope placed into the pia-superior portal, there was what is described as a 50 yard line view of the glenoid. The traditional posterior portal and anterior portal were used for working portals. The  pia-inferior labral tear was seen with also some associated redundant inferior capsule.  An elevator was used to free up the labrum off the glenoid. This was done until the subscapularis muscle fibers can be visualized. A shaver was used on forward to abrade the glenoid neck. A rasper was used to abrade the pia-inferior capsule. An suture anchors was placed at 5 o´clock, 3 o´clock, and one between 1 and 2 o´clock. The sutures were schuttled through the capsule and labrum to close the capsular redundance and to accomplish a inferior capsular shift.  There were knotless anchors placed.    Remplissage    Viewing from the pia-superior portal, the hill-sachs lesion was noted to be engaging with external rotation. Using the posterior portal, a tap was used to place an anchor. After this, the anchor was placed. The cannula was then pulled outside the capsule and infraspinatus. Suture grasping devices were used to barrera the capsule and infraspinatus and then retrieved the suture. This was done for each suture. The sutures were then tied blindly outside of the capsule. The capsule and infraspinatus were brought into the hill-sachs lesion.    Loose Body Removal    Loose bodies were encountered during the diagnostic scope. The loose bodies were removed with a shaver and grasper.    Arthroscopic Labral debridement    The labrum had some loose and frayed material. The shaver was used to contour and smooth the superior, anterior, inferior, and posterior labrum.    Arthroscopic rotator cuff repair     Viewing from the posterior portal we can visualize the rotator cuff tear laterally.  The greater tuberosity attachment for the rotator cuff was debrided to good bony base.  Anchors were placed just lateral to the articular surface of the humeral head on the greater tuberosity near the rotator cuff attachment.  Sutures were then passed through the rotator cuff just medial to the rotator cuff musculo-tendonis interval.  Next  the lateral row anchors were placed with the medial row anchors affixed to them.  This completed the repair.  Due to the significant medial retraction of the tear I was only able to do a partial repair the infraspinatus to the tuberosity.

## 2023-01-23 NOTE — PATIENT INSTRUCTIONS
Discharge Instructions  Jack Brown MD  [address], [address]  [place] [address]  [phone]  Instructions for After Surgery:  Follow up:  -Postoperative follow up evaluation should be scheduled for 1/27/2023 at [facility] physical therapy across from our office.  -Physical therapy will be set up at your first postoperative appointment.    Prescriptions:  -All prescriptions have been printed and will be made available to you at discharge.  -See multi-modal pain protocol instructions.  -While taking anti-inflammatories (ketorolac (Toradol) and meloxicam (Mobic)), avoid all other over-the-counter anti-inflammatories (ex. Advil, Aleve, ibuprofen, Motrin, naproxen, etc.)  -While taking anti-inflammatories, you may want to take a medication to protect your stomach (ex. omeprazole, Prilosec, Prevacid, Pepcid, Nexium, etc.)  -Take anti-inflammatories with food and discontinue if GI upset occurs.  -The narcotic prescription Oxycodone and gabapentin (Neurontin) may cause sedation, do NOT drive while taking these medications.    Diet:  -The first meal at home should be a liquid meal (ex. Soup and/or juices) to prevent postoperative nausea.   -Advance to normal diet as tolerated if no postoperative nausea.  -Take prescribed ondansetron (Zofran) as needed for any postoperative nausea and vomiting.    Constipation Prevention:  -Take Miralax or Senakot S/Shannen-Colace and stool softeners DAILY while taking pain medications.  -Increase water and fiber intake.  -Move around as much as possible.  -Use other, more aggressive, over-the-counter laxatives as needed for constipation (i.e. Milk of Magnesia, Dulcolax tablet/suppository, Magnesium citrate, Fleet's enema, etc.)    Wound Care:  -Do NOT remove dressing until follow up.  -Change dressing daily following initial dressing change.  -Once the incision has no drainage, you no longer need a dressing.  -It is okay to take a shower/lightly run water over the wound AFTER  daily dressing changes have been discontinued.  -Do NOT submerge the wound in water ? it is okay to take a sponge bath, use waterproof bandages, or place a sealed plastic bag over the operative extremity.    Bracing:  -Abduction sling MUST be worn at all times, except when washing your body, changing clothes, or participating in physical therapy.  -Do NOT adjust settings on your brace.  -It is okay to adjust/loosen the tightness of the Abduction sling.    Blood Clot Prevention:  -Take Aspirin 81 mg twice daily for 4 weeks postoperatively.  -Get up and walk around as much as possible.  -Utilize compression hose for 14 days postoperatively to assist with any lower extremity swelling.    Urinary Retention:  If you start having difficulty urinating, decrease Oxycodone and Robaxin and call your primary care doctor or urologist.     Pneumonia Prevention:  Stay out of bed as much as possible, walk around at least every 2 hours and continue breathing exercises as long as you are limited in mobility and on narcotics.     Ice (cryotherapy):  -Cryotherapy (i.e. cold therapy unit or ice bags) has been clinically proven to reduce pain and help control swelling postoperatively.  -Apply cryotherapy for 30 minutes/session with at least a 10-minute break to prevent cold burn injuries.  -Cryotherapy is most beneficial in the first 48-72 hours postoperatively, and can be performed as needed after that.  -Cryotherapy can be used as needed for periodic pain/swelling episodes several weeks postoperatively.    Other Instructions:  -Keep extremity elevated (i.e. above the level of the heart) as much as possible to prevent swelling and reduce pain.  -Non weight bearing to the operative extremity   -If you had a nerve block, take your Oxycodone BEFORE your pain becomes too severe.            [facility]  [address], [place] [address]  Phone [phone]/ Fax  [phone]  Multimodal Pain Management Instructions  Postoperative regimen:          Days 1-5:     Toradol/Ketorolac (anti-inflammatory) - take 10mg every 6 hours, around the clock. (While on Toradol, take a medication to protect your stomach, such as Omeprazole, Prilosec, Prevacid, Pepcid, Nexium....etc).     Robaxin/Methocarbamol (muscle relaxer) 750mg every 6 hours, around the clock for muscle spasms, thigh pain and stiffness, additional pain control. This medication is helpful for pain control while lessening your need for narcotics.    Neurontin/Gabapentin (neuropathic/shocking/ nerve like pain) 300mg every 8 hours - if you get too sleepy during the day, switch to nightly dosing or discontinue the use completely.     Tylenol/Acetaminophen (Pain Pill) 1000mg every 8 hours, around the clock. **NO MORE THAN 3000mg OF TYLENOL IN 24 HOURS**.    *Try to rotate these medications and not take them all at the same time, this will improve your overall pain control*         Days 6-14:    Robaxin/Methocarbamol 750mg every 6 hours  Tylenol 1000mg every 8 hours  Mobic/Meloxicam 7.5 twice a day          Days 15 and beyond:    Tylenol 1000mg three times a day, as needed  Mobic/Meloxicam 7.5mg Daily (optional, AS NEEDED)    *Oxycodone 5 mg (Opioid Narcotic) can be used twice daily for severe breakthrough pain ONLY*  Gradually reduce the use as the pain lessens.

## 2023-01-23 NOTE — ANESTHESIA PROCEDURE NOTES
Peripheral Block    Patient location during procedure: pre-op   Block not for primary anesthetic.  Reason for block: at surgeon's request and post-op pain management   Post-op Pain Location: Left Shoulder   Start time: 1/23/2023 9:46 AM  Timeout: 1/23/2023 9:43 AM   End time: 1/23/2023 9:48 AM    Staffing  Authorizing Provider: Gene Garrison MD  Performing Provider: Gene Garrison MD    Preanesthetic Checklist  Completed: patient identified, IV checked, site marked, risks and benefits discussed, surgical consent, monitors and equipment checked, pre-op evaluation and timeout performed  Peripheral Block  Patient position: supine  Prep: ChloraPrep  Patient monitoring: heart rate, cardiac monitor, continuous pulse ox and frequent blood pressure checks  Block type: supraclavicular  Laterality: left  Injection technique: single shot  Needle  Needle type: Stimuplex   Needle gauge: 22 G  Needle length: 2 in  Needle localization: anatomical landmarks, ultrasound guidance and nerve stimulator   -ultrasound image captured on disc.  Assessment  Injection assessment: negative aspiration, negative parasthesia and local visualized surrounding nerve  Paresthesia pain: none  Heart rate change: no  Slow fractionated injection: yes  Pain Tolerance: comfortable throughout block and no complaints  Medications:    Medications: ropivacaine (NAROPIN) injection 0.5% - Perineural   40 mL - 1/23/2023 9:47:00 AM    Additional Notes  VSS.  [facility] RN monitoring vitals throughout procedure.  Patient tolerated procedure well.  U/S Image revealed normal appearing supraclavicular anatomy. Continuously aspirated between incremental injections (HEME - neg). Appropriate neuro-stimulator twitch stopped @ 0.60 mA.

## 2023-01-23 NOTE — ANESTHESIA POSTPROCEDURE EVALUATION
Anesthesia Post Evaluation    Patient: Adam Dc    Procedure(s) Performed: Procedure(s) (LRB):  ARTHROSCOPY, SHOULDER, WITH BANKART REPAIR (Left)  DEBRIDEMENT, ROTATOR CUFF, ARTHROSCOPIC (Left)    Final Anesthesia Type: general      Patient location during evaluation: OPS  Patient participation: Yes- Able to Participate  Level of consciousness: awake and oriented  Post-procedure vital signs: reviewed and stable  Pain management: adequate  Airway patency: patent    PONV status at discharge: No PONV  Anesthetic complications: no      Cardiovascular status: stable and hemodynamically stable  Respiratory status: unassisted and spontaneous ventilation  Hydration status: euvolemic  Follow-up not needed.    Neuro: stable peripheral nerve block      Vitals Value Taken Time   /82 01/23/23 1316   Temp 36.5 °C (97.7 °F) 01/23/23 1230   Pulse 68 01/23/23 1327   Resp 15 01/23/23 1256   SpO2 94 % 01/23/23 1327   Vitals shown include unvalidated device data.      No case tracking events are documented in the log.      Pain/Sunshine Score: Pain Rating Prior to Med Admin: 0 (1/23/2023  8:53 AM)  Sunshine Score: 9 (1/23/2023 12:45 PM)

## 2023-01-27 ENCOUNTER — OFFICE VISIT (OUTPATIENT)
Dept: ORTHOPEDICS | Facility: CLINIC | Age: 53
End: 2023-01-27
Payer: MEDICARE

## 2023-01-27 DIAGNOSIS — S43.492S BANKART LESION OF LEFT SHOULDER, SEQUELA: ICD-10-CM

## 2023-01-27 DIAGNOSIS — Z98.890 STATUS POST LABRAL REPAIR OF SHOULDER: Primary | ICD-10-CM

## 2023-01-27 PROCEDURE — 1160F RVW MEDS BY RX/DR IN RCRD: CPT | Mod: CPTII,,, | Performed by: ORTHOPAEDIC SURGERY

## 2023-01-27 PROCEDURE — 99024 POSTOP FOLLOW-UP VISIT: CPT | Mod: ,,, | Performed by: ORTHOPAEDIC SURGERY

## 2023-01-27 PROCEDURE — 1159F MED LIST DOCD IN RCRD: CPT | Mod: CPTII,,, | Performed by: ORTHOPAEDIC SURGERY

## 2023-01-27 PROCEDURE — 1160F PR REVIEW ALL MEDS BY PRESCRIBER/CLIN PHARMACIST DOCUMENTED: ICD-10-PCS | Mod: CPTII,,, | Performed by: ORTHOPAEDIC SURGERY

## 2023-01-27 PROCEDURE — 99024 PR POST-OP FOLLOW-UP VISIT: ICD-10-PCS | Mod: ,,, | Performed by: ORTHOPAEDIC SURGERY

## 2023-01-27 PROCEDURE — 1159F PR MEDICATION LIST DOCUMENTED IN MEDICAL RECORD: ICD-10-PCS | Mod: CPTII,,, | Performed by: ORTHOPAEDIC SURGERY

## 2023-01-27 NOTE — PROGRESS NOTES
[facility]  Orthopedic Clinic Note      Chief Complaint:   Chief Complaint   Patient presents with    Left Shoulder - Post-op Evaluation    Post-op Evaluation     at [facility], 4 day sp l ATS shoulder w/ RTC debridement, sx 1/23/23 GL 4/23/23, unable to obtain vitals     Referring Physician: No ref. provider found      History of Present Illness:    1/23/2023 PROCEDURES:  1.  Left diagnostic shoulder arthroscopy  2.  Arthroscopic Bankart repair  3.  Remplissage  4. Arthroscopic Loose bodies removal  5. Arthroscopic debridement of labrum and biceps stump  6. Arthroscopic rotator cuff repair (partial repair of infraspinatus, could not mobilize the supraspinatus)  1/27/2023 patient presents 4 days status post the above-noted procedure.  Doing well with no major issues or concerns.  Compliant with abduction sling.  Pain well controlled.      Past Medical History:   Diagnosis Date    CAD (coronary artery disease)     Depression     Diabetes mellitus     History of heart attack 2015    Hypercholesterolemia     Hypertension        Past Surgical History:   Procedure Laterality Date    INSERTION, BARE METAL STENT, CORONARY ARTERY  2015    SHOULDER SURGERY  2015    no hardware    TONSILLECTOMY         Current Outpatient Medications   Medication Sig    acetaminophen (TYLENOL) 500 MG tablet Take 2 tablets (1,000 mg total) by mouth every 8 (eight) hours as needed for Pain.    aspirin (ECOTRIN) 81 MG EC tablet Take 1 tablet (81 mg total) by mouth 2 (two) times a day.    aspirin (ECOTRIN) 81 MG EC tablet Take 81 mg by mouth once daily.    atorvastatin (LIPITOR) 20 MG tablet Take 20 mg by mouth.    carvediloL (COREG) 6.25 MG tablet Take 6.25 mg by mouth 2 (two) times daily.    citalopram (CELEXA) 40 MG tablet Take 40 mg by mouth.    ezetimibe (ZETIA) 10 mg tablet TAKE ONE TABLET BY MOUTH EVERY DAY **DISCONTINUE NIACIN**    gabapentin (NEURONTIN) 300 MG capsule Take 1 capsule (300 mg total) by mouth every 8 (eight) hours. for  5 days    glipiZIDE (GLUCOTROL) 10 MG tablet Take 10 mg by mouth 2 (two) times daily.    ketorolac (TORADOL) 10 mg tablet Take 1 tablet (10 mg total) by mouth every 6 (six) hours. for 5 days    lisinopriL (PRINIVIL,ZESTRIL) 5 MG tablet Take 5 mg by mouth.    [START ON 1/29/2023] meloxicam (MOBIC) 7.5 MG tablet Starting on Post-op Day 6, take Mobic 7.5mg twice a day for 9 days. After 9 days, take Daily, AS NEEDED for pain    methocarbamoL (ROBAXIN) 750 MG Tab Take 1 tablet (750 mg total) by mouth every 6 (six) hours. for 14 days    ondansetron (ZOFRAN-ODT) 4 MG TbDL Take 1 tablet (4 mg total) by mouth every 6 (six) hours as needed (Nausea/Vomiting).    oxyCODONE (ROXICODONE) 5 MG immediate release tablet Take 1 tablet (5 mg total) by mouth every 12 (twelve) hours as needed (For BREAKTHROUGH PAIN ONLY).    rosuvastatin (CRESTOR) 20 MG tablet Take 20 mg by mouth.     No current facility-administered medications for this visit.       Review of patient's allergies indicates:  No Known Allergies    Family History   Problem Relation Age of Onset    Heart disease Mother     Diabetes Mother     Hypertension Mother     Heart disease Father     Diabetes Father     Hypertension Father        Social History     Socioeconomic History    Marital status: Single   Tobacco Use    Smoking status: Never    Smokeless tobacco: Never   Substance and Sexual Activity    Alcohol use: Never    Drug use: Yes     Types: Marijuana    Sexual activity: Yes     Partners: Female           Review of Systems:  All review of systems negative except for those stated in the HPI.    Examination:    Vital Signs:  There were no vitals filed for this visit.    There is no height or weight on file to calculate BMI.    Physical Examination:  General: Well-developed, well-nourished.  Neuro: Alert and oriented x 3.  Psych: Normal mood and affect.  Left upper extremity:  Incisions clean, dry, and intact. No signs of infection. Neurovascular intact distally.  Sensation intact.      Assessment: Status post labral repair of shoulder  -     Ambulatory referral/consult to Physical/Occupational Therapy; Future; Expected date: 02/03/2023    Bankart lesion of left shoulder, sequela  -     Ambulatory referral/consult to Physical/Occupational Therapy; Future; Expected date: 02/03/2023        Plan: I reviewed the arthroscopic videos with the patient and fully explained surgical procedure.  Referral entered for formal physical therapy.  Continue abduction sling per physical therapy protocol.  Continue previously prescribed medications as needed for pain.  He will return to clinic in 6-8 weeks for reevaluation.  He verbalized understanding of the plan of care with no further questions.    Jack Brown MD personally performed the services described in this documentation, including but not limited to patient's history, physical examination, and assessment and plan of care. All medical record entries made by KENRICK Mas were performed at his direction and in his presence. The medical record was reviewed and is accurate and complete.         Follow up in about 7 weeks (around 3/17/2023) for Reevaluation.      DISCLAIMER: This note may have been dictated using voice recognition software and may contain grammatical errors.     NOTE: Consult report sent to referring provider via [employer].

## 2023-03-16 ENCOUNTER — OFFICE VISIT (OUTPATIENT)
Dept: ORTHOPEDICS | Facility: CLINIC | Age: 53
End: 2023-03-16
Payer: MEDICARE

## 2023-03-16 VITALS
TEMPERATURE: 98 F | HEIGHT: 68 IN | OXYGEN SATURATION: 94 % | BODY MASS INDEX: 33.04 KG/M2 | SYSTOLIC BLOOD PRESSURE: 128 MMHG | DIASTOLIC BLOOD PRESSURE: 88 MMHG | WEIGHT: 218 LBS | HEART RATE: 68 BPM

## 2023-03-16 DIAGNOSIS — Z98.890 STATUS POST LEFT ROTATOR CUFF REPAIR: Primary | ICD-10-CM

## 2023-03-16 DIAGNOSIS — Z98.890 STATUS POST LABRAL REPAIR OF SHOULDER: ICD-10-CM

## 2023-03-16 PROCEDURE — 3074F SYST BP LT 130 MM HG: CPT | Mod: CPTII,,, | Performed by: ORTHOPAEDIC SURGERY

## 2023-03-16 PROCEDURE — 99024 PR POST-OP FOLLOW-UP VISIT: ICD-10-PCS | Mod: ,,, | Performed by: ORTHOPAEDIC SURGERY

## 2023-03-16 PROCEDURE — 3074F PR MOST RECENT SYSTOLIC BLOOD PRESSURE < 130 MM HG: ICD-10-PCS | Mod: CPTII,,, | Performed by: ORTHOPAEDIC SURGERY

## 2023-03-16 PROCEDURE — 3079F DIAST BP 80-89 MM HG: CPT | Mod: CPTII,,, | Performed by: ORTHOPAEDIC SURGERY

## 2023-03-16 PROCEDURE — 1159F MED LIST DOCD IN RCRD: CPT | Mod: CPTII,,, | Performed by: ORTHOPAEDIC SURGERY

## 2023-03-16 PROCEDURE — 3079F PR MOST RECENT DIASTOLIC BLOOD PRESSURE 80-89 MM HG: ICD-10-PCS | Mod: CPTII,,, | Performed by: ORTHOPAEDIC SURGERY

## 2023-03-16 PROCEDURE — 3008F BODY MASS INDEX DOCD: CPT | Mod: CPTII,,, | Performed by: ORTHOPAEDIC SURGERY

## 2023-03-16 PROCEDURE — 3008F PR BODY MASS INDEX (BMI) DOCUMENTED: ICD-10-PCS | Mod: CPTII,,, | Performed by: ORTHOPAEDIC SURGERY

## 2023-03-16 PROCEDURE — 1160F RVW MEDS BY RX/DR IN RCRD: CPT | Mod: CPTII,,, | Performed by: ORTHOPAEDIC SURGERY

## 2023-03-16 PROCEDURE — 1159F PR MEDICATION LIST DOCUMENTED IN MEDICAL RECORD: ICD-10-PCS | Mod: CPTII,,, | Performed by: ORTHOPAEDIC SURGERY

## 2023-03-16 PROCEDURE — 1160F PR REVIEW ALL MEDS BY PRESCRIBER/CLIN PHARMACIST DOCUMENTED: ICD-10-PCS | Mod: CPTII,,, | Performed by: ORTHOPAEDIC SURGERY

## 2023-03-16 PROCEDURE — 99024 POSTOP FOLLOW-UP VISIT: CPT | Mod: ,,, | Performed by: ORTHOPAEDIC SURGERY

## 2023-03-16 RX ORDER — MELOXICAM 15 MG/1
TABLET ORAL
COMMUNITY
Start: 2023-03-07 | End: 2023-05-18

## 2023-03-16 NOTE — PROGRESS NOTES
[facility]  Orthopedic Clinic Note      Chief Complaint:   Chief Complaint   Patient presents with    Left Shoulder - Post-op Evaluation    Post-op Evaluation     7 wk sp ATS left shoulder.RTC  debridement. sx 1/23/23  States he is in PT 2 times weekly. pain is a 1/10.      Referring Physician: No ref. provider found      History of Present Illness:    1/23/2023 PROCEDURES:  1.  Left diagnostic shoulder arthroscopy  2.  Arthroscopic Bankart repair  3.  Remplissage  4. Arthroscopic Loose bodies removal  5. Arthroscopic debridement of labrum and biceps stump  6. Arthroscopic rotator cuff repair (partial repair of infraspinatus, could not mobilize the supraspinatus)  1/27/2023 patient presents 4 days status post the above-noted procedure.  Doing well with no major issues or concerns.  Compliant with abduction sling.  Pain well controlled.  03/16/2023 patient presents 7 weeks status post the above-noted procedure.  Doing well with no major issues or concerns.  Participating in formal physical therapy.      Past Medical History:   Diagnosis Date    CAD (coronary artery disease)     Depression     Diabetes mellitus     History of heart attack 2015    Hypercholesterolemia     Hypertension        Past Surgical History:   Procedure Laterality Date    ARTHROSCOPIC DEBRIDEMENT OF ROTATOR CUFF Left 1/23/2023    Procedure: DEBRIDEMENT, ROTATOR CUFF, ARTHROSCOPIC;  Surgeon: Jack Brown MD;  Location: [facility];  Service: Orthopedics;  Laterality: Left;    INSERTION, BARE METAL STENT, CORONARY ARTERY  2015    SHOULDER ARTHROSCOPY W/ BANKHART PROCEDURE Left 1/23/2023    Procedure: ARTHROSCOPY, SHOULDER, WITH BANKART REPAIR;  Surgeon: Jack Brown MD;  Location: [facility];  Service: Orthopedics;  Laterality: Left;  Arthrex, lateral shoulder traction, bean bag, block    SHOULDER SURGERY  2015    no hardware    TONSILLECTOMY         Current Outpatient Medications   Medication Sig    aspirin (ECOTRIN) 81 MG EC tablet Take 81  "mg by mouth once daily.    atorvastatin (LIPITOR) 20 MG tablet Take 20 mg by mouth.    carvediloL (COREG) 6.25 MG tablet Take 6.25 mg by mouth 2 (two) times daily.    citalopram (CELEXA) 40 MG tablet Take 40 mg by mouth.    ezetimibe (ZETIA) 10 mg tablet TAKE ONE TABLET BY MOUTH EVERY DAY **DISCONTINUE NIACIN**    glipiZIDE (GLUCOTROL) 10 MG tablet Take 10 mg by mouth 2 (two) times daily.    lisinopriL (PRINIVIL,ZESTRIL) 5 MG tablet Take 5 mg by mouth.    meloxicam (MOBIC) 15 MG tablet Take by mouth.    rosuvastatin (CRESTOR) 20 MG tablet Take 20 mg by mouth.    acetaminophen (TYLENOL) 500 MG tablet Take 2 tablets (1,000 mg total) by mouth every 8 (eight) hours as needed for Pain. (Patient not taking: Reported on 3/16/2023)    gabapentin (NEURONTIN) 300 MG capsule Take 1 capsule (300 mg total) by mouth every 8 (eight) hours. for 5 days (Patient not taking: Reported on 3/16/2023)     No current facility-administered medications for this visit.       Review of patient's allergies indicates:  No Known Allergies    Family History   Problem Relation Age of Onset    Heart disease Mother     Diabetes Mother     Hypertension Mother     Heart disease Father     Diabetes Father     Hypertension Father        Social History     Socioeconomic History    Marital status: Single   Tobacco Use    Smoking status: Never    Smokeless tobacco: Never   Substance and Sexual Activity    Alcohol use: Never    Drug use: Yes     Types: Marijuana    Sexual activity: Yes     Partners: Female           Review of Systems:  All review of systems negative except for those stated in the HPI.    Examination:    Vital Signs:    Vitals:    03/16/23 0920 03/16/23 0924   BP: 128/88    Pulse: 68    Temp: 97.9 °F (36.6 °C)    SpO2: (!) 94%    Weight: 98.9 kg (218 lb)    Height: 5' 8" (1.727 m)    PainSc:    1       Body mass index is 33.15 kg/m².    Physical Examination:  General: Well-developed, well-nourished.  Neuro: Alert and oriented x 3.  Psych: " Normal mood and affect.  Left Shoulder Exam:  No obvious deformity. No medial or lateral scapula winging. Forward flexion to 80 degrees and abduction to 70 degrees. Negative empty can, Whipple, Drop Arm Test, Haider, impingement, AC joint tenderness. Negative biceps  groove tenderness, Day´s, Yergason´s, Speed test. Negative Apprehension and Relocation test. 4-/5 strength, normal skin appearance and palpable pulses distally. Sensibility normal.        Assessment: Status post left rotator cuff repair    Status post labral repair of shoulder        Plan:  This patient is doing well.  He is progressing in physical therapy.  He will continue with that.  I will see him back in 2 months.  He verbalized understanding of the plan of care with no further questions.    Jack Brown MD personally performed the services described in this documentation, including but not limited to patient's history, physical examination, and assessment and plan of care. All medical record entries made by KENRICK Mas were performed at his direction and in his presence. The medical record was reviewed and is accurate and complete.         Follow up in about 2 months (around 5/16/2023) for Reevaluation.      DISCLAIMER: This note may have been dictated using voice recognition software and may contain grammatical errors.     NOTE: Consult report sent to referring provider via [employer] EMR.

## 2023-05-18 ENCOUNTER — OFFICE VISIT (OUTPATIENT)
Dept: ORTHOPEDICS | Facility: CLINIC | Age: 53
End: 2023-05-18
Payer: MEDICARE

## 2023-05-18 VITALS — WEIGHT: 217 LBS | BODY MASS INDEX: 32.89 KG/M2 | HEIGHT: 68 IN

## 2023-05-18 DIAGNOSIS — Z98.890 STATUS POST LEFT ROTATOR CUFF REPAIR: Primary | ICD-10-CM

## 2023-05-18 PROCEDURE — 99214 OFFICE O/P EST MOD 30 MIN: CPT | Mod: ,,, | Performed by: ORTHOPAEDIC SURGERY

## 2023-05-18 PROCEDURE — 1159F PR MEDICATION LIST DOCUMENTED IN MEDICAL RECORD: ICD-10-PCS | Mod: CPTII,,, | Performed by: ORTHOPAEDIC SURGERY

## 2023-05-18 PROCEDURE — 1159F MED LIST DOCD IN RCRD: CPT | Mod: CPTII,,, | Performed by: ORTHOPAEDIC SURGERY

## 2023-05-18 PROCEDURE — 4010F PR ACE/ARB THEARPY RXD/TAKEN: ICD-10-PCS | Mod: CPTII,,, | Performed by: ORTHOPAEDIC SURGERY

## 2023-05-18 PROCEDURE — 1160F PR REVIEW ALL MEDS BY PRESCRIBER/CLIN PHARMACIST DOCUMENTED: ICD-10-PCS | Mod: CPTII,,, | Performed by: ORTHOPAEDIC SURGERY

## 2023-05-18 PROCEDURE — 4010F ACE/ARB THERAPY RXD/TAKEN: CPT | Mod: CPTII,,, | Performed by: ORTHOPAEDIC SURGERY

## 2023-05-18 PROCEDURE — 3008F PR BODY MASS INDEX (BMI) DOCUMENTED: ICD-10-PCS | Mod: CPTII,,, | Performed by: ORTHOPAEDIC SURGERY

## 2023-05-18 PROCEDURE — 3008F BODY MASS INDEX DOCD: CPT | Mod: CPTII,,, | Performed by: ORTHOPAEDIC SURGERY

## 2023-05-18 PROCEDURE — 99214 PR OFFICE/OUTPT VISIT, EST, LEVL IV, 30-39 MIN: ICD-10-PCS | Mod: ,,, | Performed by: ORTHOPAEDIC SURGERY

## 2023-05-18 PROCEDURE — 1160F RVW MEDS BY RX/DR IN RCRD: CPT | Mod: CPTII,,, | Performed by: ORTHOPAEDIC SURGERY

## 2023-05-18 RX ORDER — DICLOFENAC SODIUM 50 MG/1
50 TABLET, DELAYED RELEASE ORAL 2 TIMES DAILY PRN
Qty: 60 TABLET | Refills: 1 | Status: SHIPPED | OUTPATIENT
Start: 2023-05-18

## 2023-05-18 RX ORDER — METHOCARBAMOL 750 MG/1
750 TABLET, FILM COATED ORAL 3 TIMES DAILY PRN
Qty: 90 TABLET | Refills: 0 | Status: SHIPPED | OUTPATIENT
Start: 2023-05-18

## 2023-05-18 NOTE — PROGRESS NOTES
[facility] Clinic  Orthopedic Clinic Note      Chief Complaint:   Chief Complaint   Patient presents with    Left Shoulder - Pain    Shoulder Pain     4 month sp left shoulder RTC debridement, Bankart repair sx 1/23/23 Gl 4/23/23, patient states the shoulder is feeling better but is still lacking ROM and the therapist says he has a clunking in it     Referring Physician: No ref. provider found      History of Present Illness:    1/23/2023 PROCEDURES:  1.  Left diagnostic shoulder arthroscopy  2.  Arthroscopic Bankart repair  3.  Remplissage  4. Arthroscopic Loose bodies removal  5. Arthroscopic debridement of labrum and biceps stump  6. Arthroscopic rotator cuff repair (partial repair of infraspinatus, could not mobilize the supraspinatus)  1/27/2023 patient presents 4 days status post the above-noted procedure.  Doing well with no major issues or concerns.  Compliant with abduction sling.  Pain well controlled.  03/16/2023 patient presents 7 weeks status post the above-noted procedure.  Doing well with no major issues or concerns.  Participating in formal physical therapy.  05/18/2023 this patient comes in today approximately 4 months out from the above-noted procedure.  He is back working but he still does physical therapy.  He has no major issues other than some minimal pain and limitations with forward flexion and external rotation of his shoulder.      Past Medical History:   Diagnosis Date    CAD (coronary artery disease)     Depression     Diabetes mellitus     History of heart attack 2015    Hypercholesterolemia     Hypertension        Past Surgical History:   Procedure Laterality Date    ARTHROSCOPIC DEBRIDEMENT OF ROTATOR CUFF Left 1/23/2023    Procedure: DEBRIDEMENT, ROTATOR CUFF, ARTHROSCOPIC;  Surgeon: Jack Brown MD;  Location: [facility];  Service: Orthopedics;  Laterality: Left;    INSERTION, BARE METAL STENT, CORONARY ARTERY  2015    SHOULDER ARTHROSCOPY W/ BANKHART PROCEDURE Left 1/23/2023     Procedure: ARTHROSCOPY, SHOULDER, WITH BANKART REPAIR;  Surgeon: Jack Brown MD;  Location: [facility];  Service: Orthopedics;  Laterality: Left;  Arthrex, lateral shoulder traction, bean bag, block    SHOULDER SURGERY  2015    no hardware    TONSILLECTOMY         Current Outpatient Medications   Medication Sig    aspirin (ECOTRIN) 81 MG EC tablet Take 81 mg by mouth once daily.    atorvastatin (LIPITOR) 20 MG tablet Take 20 mg by mouth.    carvediloL (COREG) 6.25 MG tablet Take 6.25 mg by mouth 2 (two) times daily.    citalopram (CELEXA) 40 MG tablet Take 40 mg by mouth.    ezetimibe (ZETIA) 10 mg tablet TAKE ONE TABLET BY MOUTH EVERY DAY **DISCONTINUE NIACIN**    glipiZIDE (GLUCOTROL) 10 MG tablet Take 10 mg by mouth 2 (two) times daily.    lisinopriL (PRINIVIL,ZESTRIL) 5 MG tablet Take 5 mg by mouth.    rosuvastatin (CRESTOR) 20 MG tablet Take 20 mg by mouth.    acetaminophen (TYLENOL) 500 MG tablet Take 2 tablets (1,000 mg total) by mouth every 8 (eight) hours as needed for Pain. (Patient not taking: Reported on 3/16/2023)    diclofenac (VOLTAREN) 50 MG EC tablet Take 1 tablet (50 mg total) by mouth 2 (two) times daily as needed (pain). take with food    gabapentin (NEURONTIN) 300 MG capsule Take 1 capsule (300 mg total) by mouth every 8 (eight) hours. for 5 days (Patient not taking: Reported on 3/16/2023)    methocarbamoL (ROBAXIN) 750 MG Tab Take 1 tablet (750 mg total) by mouth 3 (three) times daily as needed (muscle spasms).     No current facility-administered medications for this visit.       Review of patient's allergies indicates:  No Known Allergies    Family History   Problem Relation Age of Onset    Heart disease Mother     Diabetes Mother     Hypertension Mother     Heart disease Father     Diabetes Father     Hypertension Father        Social History     Socioeconomic History    Marital status: Single   Tobacco Use    Smoking status: Never    Smokeless tobacco: Never   Substance and Sexual Activity     "Alcohol use: Never    Drug use: Yes     Types: Marijuana    Sexual activity: Yes     Partners: Female           Review of Systems:  All review of systems negative except for those stated in the HPI.    Examination:    Vital Signs:    Vitals:    05/18/23 0949   Weight: 98.4 kg (217 lb)   Height: 5' 8" (1.727 m)   PainSc:   2       Body mass index is 32.99 kg/m².    Physical Examination:  General: Well-developed, well-nourished.  Neuro: Alert and oriented x 3.  Psych: Normal mood and affect.  Left Shoulder Exam:  No obvious deformity. No medial or lateral scapula winging. Forward flexion to 150°, abduction to 120° and active external rotation to 60°. Negative empty can, Whipple, Drop Arm Test, Haider, impingement, AC joint tenderness. Negative biceps  groove tenderness, Day´s, Yergason´s, Speed test. Negative Apprehension and Relocation test. 4/5 strength, normal skin appearance and palpable pulses distally. Sensibility normal.        Assessment: Status post left rotator cuff repair  -     methocarbamoL (ROBAXIN) 750 MG Tab; Take 1 tablet (750 mg total) by mouth 3 (three) times daily as needed (muscle spasms).  Dispense: 90 tablet; Refill: 0  -     diclofenac (VOLTAREN) 50 MG EC tablet; Take 1 tablet (50 mg total) by mouth 2 (two) times daily as needed (pain). take with food  Dispense: 60 tablet; Refill: 1        Plan:  I would be concerned with his lack of full external rotation and abduction.  We will continue with physical therapy for now.  I did prescribe him a new anti-inflammatory and muscle relaxer to help.  I will see him back in 2 months.  If he has not dramatically improved, then we will consider an arthroscopic lysis of adhesions.    Jack Brown MD personally performed the services described in this documentation, including but not limited to patient's history, physical examination, and assessment and plan of care. All medical record entries made by KENRICK Mas were performed at his direction " and in his presence. The medical record was reviewed and is accurate and complete.         No follow-ups on file.      DISCLAIMER: This note may have been dictated using voice recognition software and may contain grammatical errors.     NOTE: Consult report sent to referring provider via [employer] EMR.

## 2023-07-06 ENCOUNTER — OFFICE VISIT (OUTPATIENT)
Dept: ORTHOPEDICS | Facility: CLINIC | Age: 53
End: 2023-07-06
Payer: MEDICARE

## 2023-07-06 VITALS
HEIGHT: 68 IN | HEART RATE: 79 BPM | BODY MASS INDEX: 32.89 KG/M2 | SYSTOLIC BLOOD PRESSURE: 117 MMHG | DIASTOLIC BLOOD PRESSURE: 81 MMHG | WEIGHT: 217 LBS

## 2023-07-06 DIAGNOSIS — Z98.890 STATUS POST LEFT ROTATOR CUFF REPAIR: Primary | ICD-10-CM

## 2023-07-06 DIAGNOSIS — Z98.890 STATUS POST LABRAL REPAIR OF SHOULDER: ICD-10-CM

## 2023-07-06 PROCEDURE — 4010F PR ACE/ARB THEARPY RXD/TAKEN: ICD-10-PCS | Mod: CPTII,,, | Performed by: ORTHOPAEDIC SURGERY

## 2023-07-06 PROCEDURE — 3079F DIAST BP 80-89 MM HG: CPT | Mod: CPTII,,, | Performed by: ORTHOPAEDIC SURGERY

## 2023-07-06 PROCEDURE — 1159F PR MEDICATION LIST DOCUMENTED IN MEDICAL RECORD: ICD-10-PCS | Mod: CPTII,,, | Performed by: ORTHOPAEDIC SURGERY

## 2023-07-06 PROCEDURE — 3008F PR BODY MASS INDEX (BMI) DOCUMENTED: ICD-10-PCS | Mod: CPTII,,, | Performed by: ORTHOPAEDIC SURGERY

## 2023-07-06 PROCEDURE — 99213 OFFICE O/P EST LOW 20 MIN: CPT | Mod: ,,, | Performed by: ORTHOPAEDIC SURGERY

## 2023-07-06 PROCEDURE — 1159F MED LIST DOCD IN RCRD: CPT | Mod: CPTII,,, | Performed by: ORTHOPAEDIC SURGERY

## 2023-07-06 PROCEDURE — 3008F BODY MASS INDEX DOCD: CPT | Mod: CPTII,,, | Performed by: ORTHOPAEDIC SURGERY

## 2023-07-06 PROCEDURE — 3079F PR MOST RECENT DIASTOLIC BLOOD PRESSURE 80-89 MM HG: ICD-10-PCS | Mod: CPTII,,, | Performed by: ORTHOPAEDIC SURGERY

## 2023-07-06 PROCEDURE — 3074F SYST BP LT 130 MM HG: CPT | Mod: CPTII,,, | Performed by: ORTHOPAEDIC SURGERY

## 2023-07-06 PROCEDURE — 1160F RVW MEDS BY RX/DR IN RCRD: CPT | Mod: CPTII,,, | Performed by: ORTHOPAEDIC SURGERY

## 2023-07-06 PROCEDURE — 3074F PR MOST RECENT SYSTOLIC BLOOD PRESSURE < 130 MM HG: ICD-10-PCS | Mod: CPTII,,, | Performed by: ORTHOPAEDIC SURGERY

## 2023-07-06 PROCEDURE — 99213 PR OFFICE/OUTPT VISIT, EST, LEVL III, 20-29 MIN: ICD-10-PCS | Mod: ,,, | Performed by: ORTHOPAEDIC SURGERY

## 2023-07-06 PROCEDURE — 1160F PR REVIEW ALL MEDS BY PRESCRIBER/CLIN PHARMACIST DOCUMENTED: ICD-10-PCS | Mod: CPTII,,, | Performed by: ORTHOPAEDIC SURGERY

## 2023-07-06 PROCEDURE — 4010F ACE/ARB THERAPY RXD/TAKEN: CPT | Mod: CPTII,,, | Performed by: ORTHOPAEDIC SURGERY

## 2023-07-06 NOTE — PROGRESS NOTES
[facility]  Orthopedic Clinic Note      Chief Complaint:   Chief Complaint   Patient presents with    Follow-up     6mo sp left shoulder ATS RTC debridement w/bankart repair sx 1/23/23. pt states he has been doing good and has little to no pain unless he moves the shoulder too far or stretches it wrong way. is not attending PT anymore (states due to insurance reasons).      Referring Physician: No ref. provider found      History of Present Illness:    1/23/2023 PROCEDURES:  1.  Left diagnostic shoulder arthroscopy  2.  Arthroscopic Bankart repair  3.  Remplissage  4. Arthroscopic Loose bodies removal  5. Arthroscopic debridement of labrum and biceps stump  6. Arthroscopic rotator cuff repair (partial repair of infraspinatus, could not mobilize the supraspinatus)  1/27/2023 patient presents 4 days status post the above-noted procedure.  Doing well with no major issues or concerns.  Compliant with abduction sling.  Pain well controlled.  03/16/2023 patient presents 7 weeks status post the above-noted procedure.  Doing well with no major issues or concerns.  Participating in formal physical therapy.  05/18/2023 this patient comes in today approximately 4 months out from the above-noted procedure.  He is back working but he still does physical therapy.  He has no major issues other than some minimal pain and limitations with forward flexion and external rotation of his shoulder.  07/06/2023 Patient presents approximately 6 months status post the above noted procedure.  He has discontinued physical therapy due to insurance issues.  Admits that he has not been participating in a home exercise program.  Denies any significant pain in the operative shoulder except with certain movements, particularly when laying on his left side.  He notes improvements slow, incremental improvements in his range of motion.  Continues to have some limitations in in strength.  He has returned to work and is able to perform most  of his day to day activities with minimal limitations.      Past Medical History:   Diagnosis Date    CAD (coronary artery disease)     Depression     Diabetes mellitus     History of heart attack 2015    Hypercholesterolemia     Hypertension        Past Surgical History:   Procedure Laterality Date    ARTHROSCOPIC DEBRIDEMENT OF ROTATOR CUFF Left 1/23/2023    Procedure: DEBRIDEMENT, ROTATOR CUFF, ARTHROSCOPIC;  Surgeon: Jack Brown MD;  Location: [facility];  Service: Orthopedics;  Laterality: Left;    INSERTION, BARE METAL STENT, CORONARY ARTERY  2015    SHOULDER ARTHROSCOPY W/ BANKHART PROCEDURE Left 1/23/2023    Procedure: ARTHROSCOPY, SHOULDER, WITH BANKART REPAIR;  Surgeon: Jack Brown MD;  Location: [facility];  Service: Orthopedics;  Laterality: Left;  Arthrex, lateral shoulder traction, bean bag, block    SHOULDER SURGERY  2015    no hardware    TONSILLECTOMY         Current Outpatient Medications   Medication Sig    acetaminophen (TYLENOL) 500 MG tablet Take 2 tablets (1,000 mg total) by mouth every 8 (eight) hours as needed for Pain. (Patient not taking: Reported on 3/16/2023)    aspirin (ECOTRIN) 81 MG EC tablet Take 81 mg by mouth once daily.    atorvastatin (LIPITOR) 20 MG tablet Take 20 mg by mouth.    carvediloL (COREG) 6.25 MG tablet Take 6.25 mg by mouth 2 (two) times daily.    citalopram (CELEXA) 40 MG tablet Take 40 mg by mouth.    diclofenac (VOLTAREN) 50 MG EC tablet Take 1 tablet (50 mg total) by mouth 2 (two) times daily as needed (pain). take with food    ezetimibe (ZETIA) 10 mg tablet TAKE ONE TABLET BY MOUTH EVERY DAY **DISCONTINUE NIACIN**    gabapentin (NEURONTIN) 300 MG capsule Take 1 capsule (300 mg total) by mouth every 8 (eight) hours. for 5 days (Patient not taking: Reported on 3/16/2023)    glipiZIDE (GLUCOTROL) 10 MG tablet Take 10 mg by mouth 2 (two) times daily.    lisinopriL (PRINIVIL,ZESTRIL) 5 MG tablet Take 5 mg by mouth.    methocarbamoL (ROBAXIN) 750 MG Tab Take 1 tablet (750  "mg total) by mouth 3 (three) times daily as needed (muscle spasms).    rosuvastatin (CRESTOR) 20 MG tablet Take 20 mg by mouth.     No current facility-administered medications for this visit.       Review of patient's allergies indicates:  No Known Allergies    Family History   Problem Relation Age of Onset    Heart disease Mother     Diabetes Mother     Hypertension Mother     Heart disease Father     Diabetes Father     Hypertension Father        Social History     Socioeconomic History    Marital status: Single   Tobacco Use    Smoking status: Never    Smokeless tobacco: Never   Substance and Sexual Activity    Alcohol use: Never    Drug use: Yes     Types: Marijuana    Sexual activity: Yes     Partners: Female           Review of Systems:  All review of systems negative except for those stated in the HPI.    Examination:    Vital Signs:    Vitals:    07/06/23 0821   BP: 117/81   Pulse: 79   Weight: 98.4 kg (217 lb)   Height: 5' 8" (1.727 m)       Body mass index is 32.99 kg/m².    Physical Examination:  General: Well-developed, well-nourished.  Neuro: Alert and oriented x 3.  Psych: Normal mood and affect.  Left Shoulder Exam:  No obvious deformity. No medial or lateral scapula winging. Forward flexion to 150°, abduction to 120° and active external rotation to 60°. Negative empty can, Whipple, Drop Arm Test, Haider, impingement, AC joint tenderness. Negative biceps  groove tenderness, Day´s, Yergason´s, Speed test. Negative Apprehension and Relocation test. 4/5 strength, normal skin appearance and palpable pulses distally. Sensibility normal.        Assessment: Status post left rotator cuff repair    Status post labral repair of shoulder        Plan:  He continues to have limitations in shoulder range of motion.  Given that he is able to perform most of his day-to-day activities with minimal modifications, we will continue to monitor his shoulder for now.  We discussed possible arthroscopic lysis of " adhesions with manipulation under anesthesia in the next 4-6 months if his range of motion limitations persist and he is having difficulty functionally.  Advised that he continue his home exercise program to work on strengthening the operative extremity and improving range of motion.  He will return to clinic if and when he would like to consider arthroscopic lysis of adhesions, or for any additional issues or concerns.  He verbalized understanding of plan of care with no further questions.    Jack Brown MD personally performed the services described in this documentation, including but not limited to patient's history, physical examination, and assessment and plan of care. All medical record entries made by KENRICK Mas were performed at his direction and in his presence. The medical record was reviewed and is accurate and complete.         Follow up if symptoms worsen or fail to improve.      DISCLAIMER: This note may have been dictated using voice recognition software and may contain grammatical errors.     NOTE: Consult report sent to referring provider via [employer] EMR.

## 2025-08-29 ENCOUNTER — HOSPITAL ENCOUNTER (OUTPATIENT)
Facility: HOSPITAL | Age: 55
Discharge: HOME | End: 2025-08-29
Attending: STUDENT IN AN ORGANIZED HEALTH CARE EDUCATION/TRAINING PROGRAM | Admitting: STUDENT IN AN ORGANIZED HEALTH CARE EDUCATION/TRAINING PROGRAM
Payer: MEDICARE

## 2025-08-29 VITALS
OXYGEN SATURATION: 95 % | TEMPERATURE: 98 F | HEIGHT: 68 IN | WEIGHT: 177.25 LBS | DIASTOLIC BLOOD PRESSURE: 65 MMHG | SYSTOLIC BLOOD PRESSURE: 100 MMHG | HEART RATE: 64 BPM | BODY MASS INDEX: 26.86 KG/M2 | RESPIRATION RATE: 16 BRPM

## 2025-08-29 DIAGNOSIS — R07.9 CHEST PAIN: ICD-10-CM

## 2025-08-29 DIAGNOSIS — I25.10 CORONARY ATHEROSCLEROSIS OF NATIVE CORONARY ARTERY: ICD-10-CM

## 2025-08-29 DIAGNOSIS — Z01.810 PRE-OPERATIVE CARDIOVASCULAR EXAMINATION: ICD-10-CM

## 2025-08-29 DIAGNOSIS — R94.8 ABNORMAL PET SCAN, MEDIASTINUM: ICD-10-CM

## 2025-08-29 LAB — POCT GLUCOSE: 162 MG/DL (ref 70–110)

## 2025-08-29 PROCEDURE — C1769 GUIDE WIRE: HCPCS | Performed by: STUDENT IN AN ORGANIZED HEALTH CARE EDUCATION/TRAINING PROGRAM

## 2025-08-29 PROCEDURE — C1887 CATHETER, GUIDING: HCPCS | Performed by: STUDENT IN AN ORGANIZED HEALTH CARE EDUCATION/TRAINING PROGRAM

## 2025-08-29 PROCEDURE — 25000003 PHARM REV CODE 250: Performed by: STUDENT IN AN ORGANIZED HEALTH CARE EDUCATION/TRAINING PROGRAM

## 2025-08-29 PROCEDURE — 25500020 PHARM REV CODE 255: Performed by: STUDENT IN AN ORGANIZED HEALTH CARE EDUCATION/TRAINING PROGRAM

## 2025-08-29 PROCEDURE — 63600175 PHARM REV CODE 636 W HCPCS: Performed by: STUDENT IN AN ORGANIZED HEALTH CARE EDUCATION/TRAINING PROGRAM

## 2025-08-29 PROCEDURE — C1894 INTRO/SHEATH, NON-LASER: HCPCS | Performed by: STUDENT IN AN ORGANIZED HEALTH CARE EDUCATION/TRAINING PROGRAM

## 2025-08-29 PROCEDURE — 93458 L HRT ARTERY/VENTRICLE ANGIO: CPT | Performed by: STUDENT IN AN ORGANIZED HEALTH CARE EDUCATION/TRAINING PROGRAM

## 2025-08-29 RX ORDER — SODIUM CHLORIDE 9 MG/ML
INJECTION, SOLUTION INTRAVENOUS CONTINUOUS
Status: DISCONTINUED | OUTPATIENT
Start: 2025-08-29 | End: 2025-08-29 | Stop reason: HOSPADM

## 2025-08-29 RX ORDER — ACETAMINOPHEN 325 MG/1
650 TABLET ORAL EVERY 4 HOURS PRN
Status: DISCONTINUED | OUTPATIENT
Start: 2025-08-29 | End: 2025-08-29 | Stop reason: HOSPADM

## 2025-08-29 RX ORDER — LIDOCAINE HYDROCHLORIDE 10 MG/ML
INJECTION, SOLUTION INFILTRATION; PERINEURAL
Status: DISCONTINUED | OUTPATIENT
Start: 2025-08-29 | End: 2025-08-29 | Stop reason: HOSPADM

## 2025-08-29 RX ORDER — HYDRALAZINE HYDROCHLORIDE 20 MG/ML
10 INJECTION INTRAMUSCULAR; INTRAVENOUS EVERY 4 HOURS PRN
Status: DISCONTINUED | OUTPATIENT
Start: 2025-08-29 | End: 2025-08-29 | Stop reason: HOSPADM

## 2025-08-29 RX ORDER — HEPARIN SODIUM 1000 [USP'U]/ML
INJECTION, SOLUTION INTRAVENOUS; SUBCUTANEOUS
Status: DISCONTINUED | OUTPATIENT
Start: 2025-08-29 | End: 2025-08-29 | Stop reason: HOSPADM

## 2025-08-29 RX ORDER — ONDANSETRON HYDROCHLORIDE 2 MG/ML
4 INJECTION, SOLUTION INTRAVENOUS EVERY 8 HOURS PRN
Status: DISCONTINUED | OUTPATIENT
Start: 2025-08-29 | End: 2025-08-29 | Stop reason: HOSPADM

## 2025-08-29 RX ORDER — NITROGLYCERIN 0.4 MG/1
0.4 TABLET SUBLINGUAL EVERY 5 MIN PRN
Status: DISCONTINUED | OUTPATIENT
Start: 2025-08-29 | End: 2025-08-29 | Stop reason: HOSPADM

## 2025-08-29 RX ORDER — SODIUM CHLORIDE 0.9 % (FLUSH) 0.9 %
10 SYRINGE (ML) INJECTION
Status: DISCONTINUED | OUTPATIENT
Start: 2025-08-29 | End: 2025-08-29 | Stop reason: HOSPADM

## 2025-08-29 RX ORDER — NITROGLYCERIN 20 MG/100ML
INJECTION INTRAVENOUS
Status: DISCONTINUED | OUTPATIENT
Start: 2025-08-29 | End: 2025-08-29 | Stop reason: HOSPADM

## 2025-08-29 RX ORDER — IOPAMIDOL 755 MG/ML
INJECTION, SOLUTION INTRAVASCULAR
Status: DISCONTINUED | OUTPATIENT
Start: 2025-08-29 | End: 2025-08-29 | Stop reason: HOSPADM

## 2025-08-29 RX ORDER — MIDAZOLAM HYDROCHLORIDE 1 MG/ML
INJECTION INTRAMUSCULAR; INTRAVENOUS
Status: DISCONTINUED | OUTPATIENT
Start: 2025-08-29 | End: 2025-08-29 | Stop reason: HOSPADM

## 2025-08-29 RX ORDER — HYDROCODONE BITARTRATE AND ACETAMINOPHEN 5; 325 MG/1; MG/1
1 TABLET ORAL EVERY 4 HOURS PRN
Refills: 0 | Status: DISCONTINUED | OUTPATIENT
Start: 2025-08-29 | End: 2025-08-29 | Stop reason: HOSPADM

## 2025-08-29 RX ORDER — FENTANYL CITRATE 50 UG/ML
INJECTION, SOLUTION INTRAMUSCULAR; INTRAVENOUS
Status: DISCONTINUED | OUTPATIENT
Start: 2025-08-29 | End: 2025-08-29 | Stop reason: HOSPADM

## 2025-08-29 RX ORDER — DIAZEPAM 5 MG/1
10 TABLET ORAL
Status: DISCONTINUED | OUTPATIENT
Start: 2025-08-29 | End: 2025-08-29 | Stop reason: HOSPADM

## 2025-08-29 RX ORDER — SACUBITRIL AND VALSARTAN 24; 26 MG/1; MG/1
1 TABLET, FILM COATED ORAL 2 TIMES DAILY
COMMUNITY

## 2025-08-29 RX ORDER — EMPAGLIFLOZIN 10 MG/1
10 TABLET, FILM COATED ORAL DAILY
COMMUNITY

## 2025-08-29 RX ORDER — VERAPAMIL HYDROCHLORIDE 2.5 MG/ML
INJECTION INTRAVENOUS
Status: DISCONTINUED | OUTPATIENT
Start: 2025-08-29 | End: 2025-08-29 | Stop reason: HOSPADM

## 2025-08-29 RX ORDER — METFORMIN HYDROCHLORIDE 1000 MG/1
1000 TABLET ORAL
COMMUNITY
Start: 2025-07-24

## 2025-08-29 RX ORDER — DIPHENHYDRAMINE HCL 25 MG
50 CAPSULE ORAL
Status: DISCONTINUED | OUTPATIENT
Start: 2025-08-29 | End: 2025-08-29 | Stop reason: HOSPADM

## 2025-08-29 RX ORDER — NITROGLYCERIN 0.4 MG/1
0.4 TABLET SUBLINGUAL EVERY 5 MIN PRN
COMMUNITY
Start: 2025-08-21

## 2025-08-29 RX ORDER — MORPHINE SULFATE 4 MG/ML
2 INJECTION, SOLUTION INTRAMUSCULAR; INTRAVENOUS EVERY 4 HOURS PRN
Refills: 0 | Status: DISCONTINUED | OUTPATIENT
Start: 2025-08-29 | End: 2025-08-29 | Stop reason: HOSPADM

## 2025-08-29 RX ADMIN — DIPHENHYDRAMINE HYDROCHLORIDE 50 MG: 25 CAPSULE ORAL at 09:08

## 2025-08-29 RX ADMIN — DIAZEPAM 10 MG: 5 TABLET ORAL at 09:08

## 2025-08-30 LAB
LEFT CCA DIST SYS: 55.2 CM/S
LEFT CCA PROX SYS: 87 CM/S
LEFT ECA SYS: 49.4 CM/S
LEFT GS AP CALF PROX: 0.35 CM
LEFT GS AP KNEE: 0.34 CM
LEFT GS AP THIGH DIST: 0.5 CM
LEFT GS AP THIGH MID: 1 CM
LEFT GS AP THIGH PROX: 0.7 CM
LEFT GS DEPTH ANKLE: 0.34 CM
LEFT GS DIA ANKLE: 0.12 CM
LEFT GS TRANS CALF PROX: 0.16 CM
LEFT GS TRANS KNEE: 0.14 CM
LEFT GS TRANS THIGH DIST: 0.19 CM
LEFT GS TRANS THIGH MID: 0.37 CM
LEFT GS TRANS THIGH PROX: 0.64 CM
LEFT ICA DIST DIAS: 25.1 CM/S
LEFT ICA DIST SYS: 80.4 CM/S
LEFT ICA MID DIAS: 32 CM/S
LEFT ICA MID SYS: 87.5 CM/S
LEFT ICA PROX DIAS: 9.86 CM/S
LEFT ICA PROX SYS: 45.4 CM/S
LEFT VERTEBRAL SYS: 45.1 CM/S
OHS CV CAROTID RIGHT ICA EDV HIGHEST: 29
OHS CV CAROTID ULTRASOUND LEFT ICA/CCA RATIO: 1.59
OHS CV CAROTID ULTRASOUND RIGHT ICA/CCA RATIO: 1.38
OHS CV CPX PATIENT HEIGHT IN: 68
OHS CV CPX PATIENT HEIGHT IN: 68
OHS CV PV CAROTID LEFT HIGHEST CCA: 87
OHS CV PV CAROTID LEFT HIGHEST ICA: 87.5
OHS CV PV CAROTID RIGHT HIGHEST CCA: 56
OHS CV PV CAROTID RIGHT HIGHEST ICA: 67.4
OHS CV US CAROTID LEFT HIGHEST EDV: 32
RIGHT CCA DIST SYS: 49 CM/S
RIGHT CCA PROX SYS: 55.6 CM/S
RIGHT ECA SYS: 87.6 CM/S
RIGHT GS AP CALF PROX: 0.27 CM
RIGHT GS AP KNEE: 0.42 CM
RIGHT GS AP THIGH DIST: 0.63 CM
RIGHT GS AP THIGH MID: 1.02 CM
RIGHT GS AP THIGH PROX: 0.6 CM
RIGHT GS DEPTH ANKLE: 0.27 CM
RIGHT GS DIA ANKLE: 0.14 CM
RIGHT GS TRANS CALF PROX: 0.14 CM
RIGHT GS TRANS KNEE: 0.24 CM
RIGHT GS TRANS THIGH DIST: 0.24 CM
RIGHT GS TRANS THIGH MID: 0.37 CM
RIGHT GS TRANS THIGH PROX: 0.62 CM
RIGHT ICA DIST DIAS: 29 CM/S
RIGHT ICA DIST SYS: 67.4 CM/S
RIGHT ICA MID DIAS: 13.4 CM/S
RIGHT ICA MID SYS: 46.7 CM/S
RIGHT ICA PROX DIAS: 9.07 CM/S
RIGHT ICA PROX SYS: 39.7 CM/S
RIGHT VERTEBRAL SYS: 52.1 CM/S

## 2025-09-04 DIAGNOSIS — I25.118 CORONARY ARTERY DISEASE OF NATIVE ARTERY OF NATIVE HEART WITH STABLE ANGINA PECTORIS: Primary | ICD-10-CM

## 2025-09-04 RX ORDER — MUPIROCIN 20 MG/G
OINTMENT TOPICAL DAILY
Qty: 1 EACH | Refills: 0 | Status: SHIPPED | OUTPATIENT
Start: 2025-09-04 | End: 2025-09-06

## (undated) DEVICE — HOLDER STRIP-T SELF ADH 2X10IN

## (undated) DEVICE — GLOVE 6.5 PROTEXIS PI BLUE

## (undated) DEVICE — GLOVE PROTEXIS HYDROGEL SZ9

## (undated) DEVICE — BURR OVAL 8 FLUTE 4MMX13CM

## (undated) DEVICE — BENZOIN TINCTURE CAPSULET

## (undated) DEVICE — PAD DEFIB CADENCE ADULT R2

## (undated) DEVICE — DRAPE STERI U-SHAPED 47X51IN

## (undated) DEVICE — GLOVE PROTEXIS HYDROGEL SZ6.5

## (undated) DEVICE — SUT MONOCRYL 2-0 S UND

## (undated) DEVICE — SPONGE GAUZE 16PLY 4X4

## (undated) DEVICE — GLOVE PROTEXIS BLUE LATEX 9

## (undated) DEVICE — CANNULA CRYTSAL PT 5.75MMX7CM

## (undated) DEVICE — BLADE SURG CARBON STEEL SZ11

## (undated) DEVICE — CANNULA TWIST-IN 7MM X 7CM

## (undated) DEVICE — NDL ANES SPINAL 18X3.5ST 18G

## (undated) DEVICE — KIT MANIFOLD LOW PRESS TUBING

## (undated) DEVICE — COVER MAYO STAND REINFRCD 30

## (undated) DEVICE — PROBE MULTI PORT RF 90 DEGREE

## (undated) DEVICE — SET CASSETTE TUBE DW OUTFLOW

## (undated) DEVICE — NDL SUREFIRE SCORPION RC

## (undated) DEVICE — TAPE ADH MEDIPORE 4 X 10YDS

## (undated) DEVICE — CATH OPTITORQUE RADIAL 5FR

## (undated) DEVICE — APPLICATOR CHLORAPREP ORN 26ML

## (undated) DEVICE — CLOSURE SKIN STERI STRIP 1/2X4

## (undated) DEVICE — PAD ABD 8X10 STERILE

## (undated) DEVICE — PASSER TIGHT CRV QUICKPASS L

## (undated) DEVICE — CANNULA LOW PROFILE 7CM 5MM

## (undated) DEVICE — DRAPE STERI INSTRUMENT 1018

## (undated) DEVICE — TUBING PUMP ARTHROSCOPY STRL

## (undated) DEVICE — BAND TR COMP DEVICE REG 24CM

## (undated) DEVICE — SOL NACL IRR 3000ML

## (undated) DEVICE — SLEEVE LATERAL TRACTION ARM

## (undated) DEVICE — Device

## (undated) DEVICE — SUT 0 36IN PDS II VIO MONO

## (undated) DEVICE — CANNULA PASSPORT 8 MM X 5CM

## (undated) DEVICE — KIT SURGICAL TURNOVER

## (undated) DEVICE — GAUZE SPONGE 4X4 12PLY

## (undated) DEVICE — CUBE COLD THERAPY POLAR PAD

## (undated) DEVICE — DRAPE SHOULDER BEACH CHAIR

## (undated) DEVICE — DRAPE SURGICAL STERI IRRG PCH

## (undated) DEVICE — KIT FIXATION PERC ARTHSCP 2.9

## (undated) DEVICE — CANNULA NASAL CO2 O2 7'

## (undated) DEVICE — GOWN POLY REINF X-LONG 2XL

## (undated) DEVICE — WIRE GUIDE INQWIRE STR 180CM

## (undated) DEVICE — DRAPE INCISE IOBAN 2 23X23IN

## (undated) DEVICE — HOOK APOLLORF NON ASPIR 90DEG

## (undated) DEVICE — BLADE COOLCUT EXCALIBER 4X13

## (undated) DEVICE — SUT MONOCRYL 3-0 PS-2 UND